# Patient Record
Sex: FEMALE | Race: WHITE | NOT HISPANIC OR LATINO | Employment: FULL TIME | URBAN - METROPOLITAN AREA
[De-identification: names, ages, dates, MRNs, and addresses within clinical notes are randomized per-mention and may not be internally consistent; named-entity substitution may affect disease eponyms.]

---

## 2017-07-10 ENCOUNTER — TELEPHONE (OUTPATIENT)
Dept: FAMILY MEDICINE | Facility: OTHER | Age: 27
End: 2017-07-10

## 2017-07-10 NOTE — TELEPHONE ENCOUNTER
Attempted number patient called from, but it was not her name on the VM - did not LM.   Attempted number in chart - unable to LM.     Kari Parson, RN, BSN

## 2017-07-10 NOTE — TELEPHONE ENCOUNTER
[facility] phone call message- patient reporting a symptom:    Symptom or request: irregular period ans cramping    Duration (how long have symptoms been present): since yesterday  Have you been treated for this before? No    Additional comments: Patient finished her period last week and stated spotting yesterday. When should she be seen for this?    Call taken on 7/10/2017 at 9:19 AM by Naty Arcos

## 2017-07-11 ENCOUNTER — ALLIED HEALTH/NURSE VISIT (OUTPATIENT)
Dept: FAMILY MEDICINE | Facility: OTHER | Age: 27
End: 2017-07-11
Payer: COMMERCIAL

## 2017-07-11 DIAGNOSIS — Z32.00 POSSIBLE PREGNANCY, NOT YET CONFIRMED: Primary | ICD-10-CM

## 2017-07-11 LAB — BETA HCG QUAL IFA URINE: NEGATIVE

## 2017-07-11 PROCEDURE — 84703 CHORIONIC GONADOTROPIN ASSAY: CPT | Performed by: ADVANCED PRACTICE MIDWIFE

## 2017-07-11 NOTE — PROGRESS NOTES
"Jacqueline Bolden is a 27 year old here today for a pregnancy test.  LMP: 17  Wt: 161 lbs.    Symptoms include: Patient had spotting on Saturday and . Currently having bilateral lower abdominal cramping \"like period cramps\". Was dizzy on Saturday, but resolved now.     Pregnancy test ordered per standing order.    Patient informed of negative pregnancy test.     Pregnancy planning discussed: pre-conception care, nutrition, smoking, alcohol & drug use, pre-galileo vitamins.    Plan:  Patient to call back if symptoms do not improve, symptoms worsen, or with further questions or concerns.  Huddled with JKD as patient was requesting a quant - request was declined as it may not show up anyway.   Advised patient to wait until at least 17 prior to taking another test - even then may be too early (would be 3w6d based on LMP).     RECOMMENDED DISPOSITION:  Home care advice -   Will comply with recommendation: YES   If further questions/concerns or if Sx do not improve, worsen or new Sx develop, call your PCP or [facility] as soon as possible.    NOTES:  Disposition was determined by the first positive assessment question, therefore all previous assessment questions were negative.     Guideline used:  Telephone Triage Protocols for Nurses, Fourth Edition, Ana Boateng  Message handled by Nurse Triage with [facility] - provider name: Ludy Moyer CNM.    Kari Parson, RN, BSN      "

## 2017-07-11 NOTE — MR AVS SNAPSHOT
"              After Visit Summary   2017    Jacqueline Bolden    MRN: [id number]           Patient Information     Date Of Birth          1990        Visit Information        Provider Department      2017 2:30 PM NL RN TEAM A, TAN [facility]        Today's Diagnoses     Possible pregnancy, not yet confirmed    -  1       Follow-ups after your visit        Who to contact     If you have questions or need follow up information about today's clinic visit or your schedule please contact [facility] directly at [phone].  Normal or non-critical lab and imaging results will be communicated to you by [employer], letter or phone within 4 business days after the clinic has received the results. If you do not hear from us within 7 days, please contact the clinic through [email or web address] or phone. If you have a critical or abnormal lab result, we will notify you by phone as soon as possible.  Submit refill requests through [employer] or call your pharmacy and they will forward the refill request to us. Please allow 3 business days for your refill to be completed.          Additional Information About Your Visit        MyChart Information     [employer] lets you send messages to your doctor, view your test results, renew your prescriptions, schedule appointments and more. To sign up, go to [email or web address] . Click on \"Log in\" on the left side of the screen, which will take you to the Welcome page. Then click on \"Sign up Now\" on the right side of the page.     You will be asked to enter the access code listed below, as well as some personal information. Please follow the directions to create your username and password.     Your access code is: [email or web address]  Expires: 10/9/2017  3:17 PM     Your access code will  in 90 days. If you need help or a new code, please call your [facility] or [phone].        Care EveryWhere ID     This is your Care EveryWhere ID. This could be " used by other organizations to access your [place] medical records  [id number]         Blood Pressure from Last 3 Encounters:   02/10/16 124/80   08/02/11 102/60   12/22/09 102/60    Weight from Last 3 Encounters:   02/10/16 177 lb (80.3 kg)   08/02/11 145 lb 12.8 oz (66.1 kg)   12/22/09 150 lb (68 kg)              We Performed the Following     Beta HCG qual IFA urine        Primary Care Provider Office Phone # Fax #    Jhoana Wen PA-C [phone]       [facility] [address]  [facility] [address]        Equal Access to Services     ARYA ZAMUDIO : Hadii elias tanner Soalfredo, waaxda luqadaha, qaybta kaalmada adelucioyada, beatrice bullock . So [facility] [phone].    ATENCIÓN: Si habla español, tiene a souza disposición servicios gratuitos de asistencia lingüística. Llame al [phone].    We comply with applicable federal civil rights laws and [place] laws. We do not discriminate on the basis of race, color, national origin, age, disability sex, sexual orientation or gender identity.            Thank you!     Thank you for choosing [facility]  for your care. Our goal is always to provide you with excellent care. Hearing back from our patients is one way we can continue to improve our services. Please take a few minutes to complete the written survey that you may receive in the mail after your visit with us. Thank you!             Your Updated Medication List - Protect others around you: Learn how to safely use, store and throw away your medicines at www.disposemymeds.org.      Notice  As of 7/11/2017  3:17 PM    You have not been prescribed any medications.

## 2017-09-07 ENCOUNTER — OFFICE VISIT (OUTPATIENT)
Dept: OBGYN | Facility: OTHER | Age: 27
End: 2017-09-07
Payer: COMMERCIAL

## 2017-09-07 VITALS
HEIGHT: 61 IN | HEART RATE: 84 BPM | DIASTOLIC BLOOD PRESSURE: 70 MMHG | SYSTOLIC BLOOD PRESSURE: 110 MMHG | WEIGHT: 165.5 LBS | BODY MASS INDEX: 31.25 KG/M2

## 2017-09-07 DIAGNOSIS — N97.9 FEMALE INFERTILITY: Primary | ICD-10-CM

## 2017-09-07 DIAGNOSIS — N93.9 ABNORMAL UTERINE BLEEDING: ICD-10-CM

## 2017-09-07 DIAGNOSIS — R10.2 PELVIC PAIN IN FEMALE: ICD-10-CM

## 2017-09-07 PROCEDURE — 99203 OFFICE O/P NEW LOW 30 MIN: CPT | Performed by: OBSTETRICS & GYNECOLOGY

## 2017-09-07 NOTE — NURSING NOTE
"Chief Complaint   Patient presents with     Consult     Fertility       Initial /70 (BP Location: Left arm, Patient Position: Chair, Cuff Size: Adult Regular)  Pulse 84  Ht 5' 1.42\" (1.56 m)  Wt 165 lb 8 oz (75.1 kg)  LMP 08/28/2017 (Approximate)  BMI 30.85 kg/m2 Estimated body mass index is 30.85 kg/(m^2) as calculated from the following:    Height as of this encounter: 5' 1.42\" (1.56 m).    Weight as of this encounter: 165 lb 8 oz (75.1 kg).  Medication Reconciliation: complete     Penelope Garza, CMA  September 7, 2017      "

## 2017-09-07 NOTE — MR AVS SNAPSHOT
After Visit Summary   9/7/2017    Jacqueline Bolden    MRN: [id number]           Patient Information     Date Of Birth          1990        Visit Information        Provider Department      9/7/2017 9:45 AM Anna Norris MD [facility]        Today's Diagnoses     Female infertility    -  1    Pelvic pain in female        Abnormal uterine bleeding          Care Instructions    Please call [phone] to schedule your HSG    Understanding Fertility Problems: Improving Your Chances  Your doctor may suggest some simple methods to help you get pregnant. Most focus on predicting ovulation--the time when sex has the best chance of success. Your doctor may also advise working on other factors that can affect fertility.  Predicting Ovulation  Conception is only possible when a woman is ovulating. One signal of ovulation is a surge in the hormone LH. Other signals include changes in body temperature and changes in cervical mucus.  Ovulation Predictor Kits  One of the best signals of ovulation is a sudden increase in the hormone LH. A simple urine test called an ovulation predictor kit (available at most drugstores) can help pinpoint this surge. Have sex the day you notice the surge. Keep having sex every 1-2 days over the next few days, or as often as your doctor suggests.  Body Temperature Changes  A woman s resting temperature (basal body temperature) often drops just before ovulation. Your doctor may ask you to take your temperature each morning to pinpoint this change. Have sex the day your temperature drops. Keep having sex daily until the day after your temperature rises again.  Cervical Mucus Changes  Shortly before ovulation a woman s cervical mucus gets clear and stretchy. The mucus also increases in quantity. This makes it easier for sperm to travel through the cervix. Not all women notice these changes. But if you do, begin having sex daily until the mucus thickens  again.  Working on Other Factors That Affect Fertility  Certain lifestyle and health habits can affect fertility. Be sure to talk with your doctor about these issues. You may be able to make some simple changes to improve your chances of conception. Keep in mind, though, that it can take time for healthy changes to improve your fertility.  Weight Problems   Being overweight or underweight can affect hormone levels. In women, this can impair ovulation. In men, obesity can decrease sperm count.  Medications, Supplements, and Herbal Remedies  Medications, supplements, and herbal remedies can affect hormone levels in men and women. They can also affect the quantity and quality of sperm. Be sure to tell your doctor about any substances you take.  Sexually Transmitted Diseases  Sexually transmitted diseases (STDs) can scar the reproductive organs in both men and women. If you ve had an STD, be sure to tell your doctor.  Testicular Heat  A man s testicles are normally a few degrees cooler than the rest of his body. When the testicles are too warm, sperm production may decline. Try to avoid excess heat from things like hot tubs and saunas.  Smoking, Alcohol, and Drugs  Smoking can affect estrogen levels and decrease egg production in women. Men who smoke may have lowered sperm count. Excessive alcohol or drug use can also decrease fertility in both men and women.  Chemical Exposure  Certain chemicals can affect hormone levels. Talk with your doctor if you re regularly exposed to strong chemicals. You should also ask about lubricants used during sex. Some types can be toxic to sperm.  Other Factors  Excessive exercise can decrease hormone production. It can also cause irregular menstruation in women. Ongoing stress is another factor that may affect fertility and cause ovulation problems.                Follow-ups after your visit        Future tests that were ordered for you today     Open Future Orders        Priority  "Expected Expires Ordered    US Pelvic Complete with Transvaginal Routine  2017    XR Hysterosalpingogram Routine 2017            Who to contact     If you have questions or need follow up information about today's clinic visit or your schedule please contact [facility] directly at [phone].  Normal or non-critical lab and imaging results will be communicated to you by [employer], letter or phone within 4 business days after the clinic has received the results. If you do not hear from us within 7 days, please contact the clinic through [employer] or phone. If you have a critical or abnormal lab result, we will notify you by phone as soon as possible.  Submit refill requests through [employer] or call your pharmacy and they will forward the refill request to us. Please allow 3 business days for your refill to be completed.          Additional Information About Your Visit        [employer] Information     [employer] lets you send messages to your doctor, view your test results, renew your prescriptions, schedule appointments and more. To sign up, go to [email or web address] . Click on \"Log in\" on the left side of the screen, which will take you to the Welcome page. Then click on \"Sign up Now\" on the right side of the page.     You will be asked to enter the access code listed below, as well as some personal information. Please follow the directions to create your username and password.     Your access code is: [email or web address]  Expires: 10/9/2017  3:17 PM     Your access code will  in 90 days. If you need help or a new code, please call your [facility] or [phone].        Care EveryWhere ID     This is your Care EveryWhere ID. This could be used by other organizations to access your [place] medical records  [id number]        Your Vitals Were     Pulse Height Last Period BMI (Body Mass Index)          84 5' 1.42\" (1.56 m) 2017 (Approximate) 30.85 kg/m2      "    Blood Pressure from Last 3 Encounters:   09/07/17 110/70   02/10/16 124/80   08/02/11 102/60    Weight from Last 3 Encounters:   09/07/17 165 lb 8 oz (75.1 kg)   02/10/16 177 lb (80.3 kg)   08/02/11 145 lb 12.8 oz (66.1 kg)               Primary Care Provider Office Phone # Fax #    Jhoana Wen PA-C [phone]       [address] [place] [address]        Equal Access to Services     [facility]: Hadii aad ku hadasho Soomaali, waaxda luqadaha, qaybta kaalmada adeegyada, waxsandra bullock . So [facility] [phone].    ATENCIÓN: Si habla español, tiene a souza disposición servicios gratuitos de asistencia lingüística. [facility] [phone].    We comply with applicable federal civil rights laws and [place] laws. We do not discriminate on the basis of race, color, national origin, age, disability sex, sexual orientation or gender identity.            Thank you!     Thank you for choosing [facility]  for your care. Our goal is always to provide you with excellent care. Hearing back from our patients is one way we can continue to improve our services. Please take a few minutes to complete the written survey that you may receive in the mail after your visit with us. Thank you!             Your Updated Medication List - Protect others around you: Learn how to safely use, store and throw away your medicines at www.disposemymeds.org.      Notice  As of 9/7/2017 10:22 AM    You have not been prescribed any medications.

## 2017-09-07 NOTE — PROGRESS NOTES
OB/GYN New   2017      NAME:  Jacqueline Bolden  PCP:  Jhoana Wen  MRN:  [id number]      Impression / Plan     27 year old  with:      ICD-10-CM    1. Female infertility N97.9 XR Hysterosalpingogram   2. Pelvic pain in female R10.2 US Pelvic Complete with Transvaginal     XR Hysterosalpingogram   3. Abnormal uterine bleeding N93.9 US Pelvic Complete with Transvaginal     Body mass index is 30.85 kg/(m^2).   E cig use - recommend cessation.     We discussed fertility and management options. Patient appears to be ovulating. I recommend HSG to evaluate tubal patency and ultrasound to look at the ovaries. We will consider laboratory evaluation at a later date. Patient has no clinical signs of endocrine abnormalities.  [facility] semen analysis which only assesses the count.   Handout given for HSG and ways to optimize fertility.      I will call her with the results and she will consider follow up in the office to discuss the next steps.      Chief Complaint     Chief Complaint   Patient presents with     Consult     Fertility       HPI     Jacqueline Bolden is a  27 year old female who is seen for infertility.   She has been trying to get pregnant for 13 months.  She has not used contraception in about 4 - 6 years.     Family history of endometriosis.  She had a previous provider that was concerned she may have endometriosis due ot pelvic pain.  Pain is improved since then.  Personal history of anemia.      Female factor:    General health: healthy.    Prior pregnancies:  No  Previous infertility work up:  No  Most recent form of birth control:  IUD removed in .  No birth control since then.  OCP in high school.    History of STI:  No  Ovulation predictor kits: Yes - she has used them a few times.  She seems to be ovulating.    Timed intercourse: Yes  Tubal study done?:  No    Occupation of Patient: Mental  health    Chemical or toxin exposure at home or work: Yes    Male factor:   General health: healthy.    Father of prior pregnancies:  No  Semen Analysis: [employer] test was normal.        Occupation of Partner:    Chemical or toxin exposure at home or work: No      Patient's last menstrual period was 08/28/2017 (approximate).  Regular cycles.  Every 28-30 days.  Last 5 -6 days.  One heavy day.  No pain aside from some back pain and breast pain.  Spotting 6 days after her period this month and last month.  This is not typical for her.      Patient has occasional dyspareunia on the left.  Sporadic and not necessarily associated with deep penetration.      Date of Last Pap Smear:  At outside facility ([place]) in about 2015, normal per patient.    Lab Results   Component Value Date    PAP NIL 06/26/2008     Previous abnormal pap smear: No       Problem List     Patient Active Problem List    Diagnosis Date Noted     CARDIOVASCULAR SCREENING; LDL GOAL LESS THAN 160 10/31/2010     Priority: Medium     Tobacco use disorder 04/11/2007     Priority: Medium     Dysmenorrhea 12/01/2005     Priority: Medium       Medications     No current outpatient prescriptions on file.     No current facility-administered medications for this visit.         Allergies     Allergies   Allergen Reactions     No Known Drug Allergies        Past Medical/Surgical History     Past Medical History:   Diagnosis Date     Dysmenorrhea      Irregular menstrual cycle      Surveillance of previously prescribed intrauterine contraceptive device 12/2008    angelita       Past Surgical History:   Procedure Laterality Date     NO HISTORY OF SURGERY          Social History     Social History     Social History     Marital status:      Spouse name: N/A     Number of children: N/A     Years of education: N/A     Occupational History     student 0student     [employer]     [employer] Student  "    Social History Main Topics     Smoking status: Current Every Day Smoker     Packs/day: 1.00     Types: Cigarettes     Smokeless tobacco: Never Used      Comment:  1 pack every 5 days     Alcohol use Yes      Comment: OCC.     Drug use: No     Sexual activity: Yes     Partners: Male     Birth control/ protection: Condom     Other Topics Concern     Not on file     Social History Narrative    Lives with housemates. No domestic violence issues.       Family History      Family History   Problem Relation Age of Onset     Family History Negative No family hx of        ROS     A 10 organ review of systems was asked and the pertinent positives and negatives are listed in the HPI. All other organ systems can be considered negative.     Physical Exam   Vitals: /70 (BP Location: Left arm, Patient Position: Chair, Cuff Size: Adult Regular)  Pulse 84  Ht 5' 1.42\" (1.56 m)  Wt 165 lb 8 oz (75.1 kg)  LMP 08/28/2017 (Approximate)  BMI 30.85 kg/m2    General: Comfortable, no obvious distress, overweight body habitus  Psych: Alert and orientated x 3. Appropriate affect, good insight.   : deferred  Extremities: No peripheral edema, nontender       Labs/Imaging       Labs were reviewed in Epic   TSH   Date Value Ref Range Status   02/19/2009 2.00 0.4 - 5.0 mU/L Final       Imaging was reviewed in Epic.   No recent imaging.      40 minutes was spent with patient, more than 50% counseling and coordinating care    Anna Norris MD       "

## 2017-09-07 NOTE — PATIENT INSTRUCTIONS
Please call [phone] to schedule your HSG    Understanding Fertility Problems: Improving Your Chances  Your doctor may suggest some simple methods to help you get pregnant. Most focus on predicting ovulation--the time when sex has the best chance of success. Your doctor may also advise working on other factors that can affect fertility.  Predicting Ovulation  Conception is only possible when a woman is ovulating. One signal of ovulation is a surge in the hormone LH. Other signals include changes in body temperature and changes in cervical mucus.  Ovulation Predictor Kits  One of the best signals of ovulation is a sudden increase in the hormone LH. A simple urine test called an ovulation predictor kit (available at most [employer]) can help pinpoint this surge. Have sex the day you notice the surge. Keep having sex every 1-2 days over the next few days, or as often as your doctor suggests.  Body Temperature Changes  A woman s resting temperature (basal body temperature) often drops just before ovulation. Your doctor may ask you to take your temperature each morning to pinpoint this change. Have sex the day your temperature drops. Keep having sex daily until the day after your temperature rises again.  Cervical Mucus Changes  Shortly before ovulation a woman s cervical mucus gets clear and stretchy. The mucus also increases in quantity. This makes it easier for sperm to travel through the cervix. Not all women notice these changes. But if you do, begin having sex daily until the mucus thickens again.  Working on Other Factors That Affect Fertility  Certain lifestyle and health habits can affect fertility. Be sure to talk with your doctor about these issues. You may be able to make some simple changes to improve your chances of conception. Keep in mind, though, that it can take time for healthy changes to improve your fertility.  Weight Problems   Being overweight or underweight can affect hormone levels. In women,  this can impair ovulation. In men, obesity can decrease sperm count.  Medications, Supplements, and Herbal Remedies  Medications, supplements, and herbal remedies can affect hormone levels in men and women. They can also affect the quantity and quality of sperm. Be sure to tell your doctor about any substances you take.  Sexually Transmitted Diseases  Sexually transmitted diseases (STDs) can scar the reproductive organs in both men and women. If you ve had an STD, be sure to tell your doctor.  Testicular Heat  A man s testicles are normally a few degrees cooler than the rest of his body. When the testicles are too warm, sperm production may decline. Try to avoid excess heat from things like hot tubs and saunas.  Smoking, Alcohol, and Drugs  Smoking can affect estrogen levels and decrease egg production in women. Men who smoke may have lowered sperm count. Excessive alcohol or drug use can also decrease fertility in both men and women.  Chemical Exposure  Certain chemicals can affect hormone levels. Talk with your doctor if you re regularly exposed to strong chemicals. You should also ask about lubricants used during sex. Some types can be toxic to sperm.  Other Factors  Excessive exercise can decrease hormone production. It can also cause irregular menstruation in women. Ongoing stress is another factor that may affect fertility and cause ovulation problems.

## 2017-09-28 ENCOUNTER — RADIANT APPOINTMENT (OUTPATIENT)
Dept: ULTRASOUND IMAGING | Facility: CLINIC | Age: 27
End: 2017-09-28
Attending: OBSTETRICS & GYNECOLOGY
Payer: COMMERCIAL

## 2017-09-28 DIAGNOSIS — N93.9 ABNORMAL UTERINE BLEEDING: ICD-10-CM

## 2017-09-28 DIAGNOSIS — R10.2 PELVIC PAIN IN FEMALE: ICD-10-CM

## 2017-09-28 LAB — RADIOLOGIST FLAGS: NORMAL

## 2017-09-28 PROCEDURE — 76830 TRANSVAGINAL US NON-OB: CPT | Performed by: STUDENT IN AN ORGANIZED HEALTH CARE EDUCATION/TRAINING PROGRAM

## 2017-09-28 PROCEDURE — 76856 US EXAM PELVIC COMPLETE: CPT | Performed by: STUDENT IN AN ORGANIZED HEALTH CARE EDUCATION/TRAINING PROGRAM

## 2017-10-31 ENCOUNTER — RADIANT APPOINTMENT (OUTPATIENT)
Dept: GENERAL RADIOLOGY | Facility: CLINIC | Age: 27
End: 2017-10-31
Attending: OBSTETRICS & GYNECOLOGY
Payer: COMMERCIAL

## 2017-10-31 PROCEDURE — 74740 X-RAY FEMALE GENITAL TRACT: CPT

## 2017-10-31 PROCEDURE — 58340 CATHETER FOR HYSTEROGRAPHY: CPT

## 2017-10-31 RX ORDER — IOPAMIDOL 510 MG/ML
50 INJECTION, SOLUTION INTRAVASCULAR ONCE
Status: COMPLETED | OUTPATIENT
Start: 2017-10-31 | End: 2017-10-31

## 2017-10-31 RX ADMIN — IOPAMIDOL 10 ML: 510 INJECTION, SOLUTION INTRAVASCULAR at 12:06

## 2017-11-01 ENCOUNTER — TELEPHONE (OUTPATIENT)
Dept: FAMILY MEDICINE | Facility: CLINIC | Age: 27
End: 2017-11-01

## 2017-11-01 ENCOUNTER — MYC MEDICAL ADVICE (OUTPATIENT)
Dept: OBGYN | Facility: OTHER | Age: 27
End: 2017-11-01

## 2017-11-01 NOTE — TELEPHONE ENCOUNTER
Reason for Call:  Other call back    Detailed comments: Patient calling to see if we have received HSG test. Patient would like a confirmation call once we have that     Phone Number Patient can be reached at: Cell number on file:    Telephone Information:   Mobile [phone]       Best Time: any    Can we leave a detailed message on this number? YES    Call taken on 11/1/2017 at 9:28 AM by Janet Real

## 2017-11-02 NOTE — TELEPHONE ENCOUNTER
Long discussion regarding imaging results.  Small chance she had spasm that resulted in the appearance of tubal occlusion.  Recommend she see FLETCHER.  Dx LS and chromotubation could be considered in the future.      20 min

## 2017-11-10 ENCOUNTER — HEALTH MAINTENANCE LETTER (OUTPATIENT)
Age: 27
End: 2017-11-10

## 2017-11-17 ENCOUNTER — TRANSFERRED RECORDS (OUTPATIENT)
Dept: HEALTH INFORMATION MANAGEMENT | Facility: CLINIC | Age: 27
End: 2017-11-17

## 2017-11-20 DIAGNOSIS — Z31.41 FERTILITY TESTING: Primary | ICD-10-CM

## 2017-11-24 DIAGNOSIS — Z31.41 FERTILITY TESTING: ICD-10-CM

## 2017-11-24 LAB
ABO + RH BLD: NORMAL
ABO + RH BLD: NORMAL
BLD GP AB SCN SERPL QL: NORMAL
BLOOD BANK CMNT PATIENT-IMP: NORMAL
ESTRADIOL SERPL-MCNC: 30 PG/ML
FSH SERPL-ACNC: 6.9 IU/L
LH SERPL-ACNC: 4.1 IU/L
SPECIMEN EXP DATE BLD: NORMAL
TSH SERPL DL<=0.005 MIU/L-ACNC: 1.4 MU/L (ref 0.4–4)

## 2017-11-24 PROCEDURE — 86900 BLOOD TYPING SEROLOGIC ABO: CPT | Performed by: NURSE PRACTITIONER

## 2017-11-24 PROCEDURE — 86850 RBC ANTIBODY SCREEN: CPT | Performed by: NURSE PRACTITIONER

## 2017-11-24 PROCEDURE — 83001 ASSAY OF GONADOTROPIN (FSH): CPT | Performed by: NURSE PRACTITIONER

## 2017-11-24 PROCEDURE — 83002 ASSAY OF GONADOTROPIN (LH): CPT | Performed by: NURSE PRACTITIONER

## 2017-11-24 PROCEDURE — 86762 RUBELLA ANTIBODY: CPT | Performed by: NURSE PRACTITIONER

## 2017-11-24 PROCEDURE — 99000 SPECIMEN HANDLING OFFICE-LAB: CPT | Performed by: NURSE PRACTITIONER

## 2017-11-24 PROCEDURE — 82670 ASSAY OF TOTAL ESTRADIOL: CPT | Performed by: NURSE PRACTITIONER

## 2017-11-24 PROCEDURE — 86787 VARICELLA-ZOSTER ANTIBODY: CPT | Performed by: NURSE PRACTITIONER

## 2017-11-24 PROCEDURE — 86901 BLOOD TYPING SEROLOGIC RH(D): CPT | Performed by: NURSE PRACTITIONER

## 2017-11-24 PROCEDURE — 87389 HIV-1 AG W/HIV-1&-2 AB AG IA: CPT | Performed by: NURSE PRACTITIONER

## 2017-11-24 PROCEDURE — 87340 HEPATITIS B SURFACE AG IA: CPT | Performed by: NURSE PRACTITIONER

## 2017-11-24 PROCEDURE — 86803 HEPATITIS C AB TEST: CPT | Performed by: NURSE PRACTITIONER

## 2017-11-24 PROCEDURE — 83520 IMMUNOASSAY QUANT NOS NONAB: CPT | Mod: 90 | Performed by: NURSE PRACTITIONER

## 2017-11-24 PROCEDURE — 82306 VITAMIN D 25 HYDROXY: CPT | Performed by: NURSE PRACTITIONER

## 2017-11-24 PROCEDURE — 36415 COLL VENOUS BLD VENIPUNCTURE: CPT | Performed by: NURSE PRACTITIONER

## 2017-11-24 PROCEDURE — 84443 ASSAY THYROID STIM HORMONE: CPT | Performed by: NURSE PRACTITIONER

## 2017-11-27 DIAGNOSIS — N94.6 DYSMENORRHEA: Primary | ICD-10-CM

## 2017-11-27 LAB
DEPRECATED CALCIDIOL+CALCIFEROL SERPL-MC: 24 UG/L (ref 20–75)
HBV SURFACE AG SERPL QL IA: NONREACTIVE
HCV AB SERPL QL IA: NONREACTIVE
HIV 1+2 AB+HIV1 P24 AG SERPL QL IA: NONREACTIVE
MIS SERPL-MCNC: 2.4 NG/ML (ref 0.4–16.02)
RUBV IGG SERPL IA-ACNC: 13 IU/ML
VZV IGG SER QL IA: 1.4 AI (ref 0–0.8)

## 2017-11-27 RX ORDER — DROSPIRENONE AND ETHINYL ESTRADIOL 0.03MG-3MG
1 KIT ORAL DAILY
Qty: 30 TABLET | Refills: 11 | Status: SHIPPED | OUTPATIENT
Start: 2017-11-27 | End: 2017-12-15

## 2017-12-15 ENCOUNTER — MYC MEDICAL ADVICE (OUTPATIENT)
Dept: OBGYN | Facility: OTHER | Age: 27
End: 2017-12-15

## 2017-12-15 DIAGNOSIS — N94.6 DYSMENORRHEA: ICD-10-CM

## 2017-12-15 RX ORDER — DROSPIRENONE AND ETHINYL ESTRADIOL 0.03MG-3MG
1 KIT ORAL DAILY
Qty: 30 TABLET | Refills: 11 | Status: SHIPPED | OUTPATIENT
Start: 2017-12-15 | End: 2018-01-05

## 2017-12-29 ENCOUNTER — TRANSFERRED RECORDS (OUTPATIENT)
Dept: HEALTH INFORMATION MANAGEMENT | Facility: CLINIC | Age: 27
End: 2017-12-29

## 2017-12-29 ENCOUNTER — TELEPHONE (OUTPATIENT)
Dept: OBGYN | Facility: OTHER | Age: 27
End: 2017-12-29

## 2017-12-29 NOTE — TELEPHONE ENCOUNTER
Is wondering if she can get a pollyp removed in clinic or does this need to be in [place] or [place]? It is per her Dr in [place]. Dr Casey.

## 2017-12-29 NOTE — TELEPHONE ENCOUNTER
Can we get more information? Is this a cervical or uterine polyp? There was a questionable uterine polyp seen on ultrasound in September, but if she had more recent imaging with Dr. Lundberg, can they send results? Anjelica GREER CNP

## 2017-12-29 NOTE — TELEPHONE ENCOUNTER
"Per [facility] Dr[facility] Roderick Lundberg: \"we identified the presence of a 1 cm polyp originating from the posterior wall of the endometrial cavity.  In addition, there was a serpiginous adnexal mass seen on the left.  Jacqueline's prior HSG showed bilateral tubal fill but no spill, and I am concerned about the presence of one or more hydrosalpinges.  I have recommended the polyp be removed and the tubes further assessed prior to IVF.\"    Naty Daigle RN    "

## 2018-01-01 NOTE — TELEPHONE ENCOUNTER
The patient will need to make an appointment in the office. She will need a laparoscopy and a hysteroscopy, which is typically done at the  surgery center (outpatient surgery).

## 2018-01-02 NOTE — TELEPHONE ENCOUNTER
Pt informed. She is wondering how far out it would be to schedule the surgery? She did not want to schedule the consult at this time.  Thank you,  Krystina Hendrix- Pt Rep.

## 2018-01-02 NOTE — TELEPHONE ENCOUNTER
Usually 2-4 weeks from the consult to the time of surgery.  She will also need a preop within 30 days of the procedure.

## 2018-01-04 ENCOUNTER — MYC MEDICAL ADVICE (OUTPATIENT)
Dept: OBGYN | Facility: OTHER | Age: 28
End: 2018-01-04

## 2018-01-04 DIAGNOSIS — N94.6 DYSMENORRHEA: ICD-10-CM

## 2018-01-05 RX ORDER — DROSPIRENONE AND ETHINYL ESTRADIOL 0.03MG-3MG
1 KIT ORAL DAILY
Qty: 30 TABLET | Refills: 1 | Status: SHIPPED | OUTPATIENT
Start: 2018-01-05 | End: 2018-03-29

## 2018-01-05 NOTE — TELEPHONE ENCOUNTER
Please let the patient know I sent in the script for the birth control to [facility] in [place].  Also please help her set up a preop visit with family medicine asap

## 2018-01-05 NOTE — TELEPHONE ENCOUNTER
Left message for patient to return call, message below can be relayed to her and pre-op appointment scheduled with FP

## 2018-01-08 ENCOUNTER — TRANSFERRED RECORDS (OUTPATIENT)
Dept: HEALTH INFORMATION MANAGEMENT | Facility: CLINIC | Age: 28
End: 2018-01-08

## 2018-01-09 ENCOUNTER — OFFICE VISIT (OUTPATIENT)
Dept: FAMILY MEDICINE | Facility: OTHER | Age: 28
End: 2018-01-09
Payer: COMMERCIAL

## 2018-01-09 VITALS
DIASTOLIC BLOOD PRESSURE: 70 MMHG | SYSTOLIC BLOOD PRESSURE: 116 MMHG | BODY MASS INDEX: 30.55 KG/M2 | TEMPERATURE: 98.5 F | HEIGHT: 62 IN | RESPIRATION RATE: 16 BRPM | WEIGHT: 166 LBS | HEART RATE: 70 BPM

## 2018-01-09 DIAGNOSIS — Z01.818 PREOP GENERAL PHYSICAL EXAM: Primary | ICD-10-CM

## 2018-01-09 DIAGNOSIS — N84.0 UTERINE POLYP: ICD-10-CM

## 2018-01-09 PROCEDURE — 99214 OFFICE O/P EST MOD 30 MIN: CPT | Performed by: NURSE PRACTITIONER

## 2018-01-09 NOTE — NURSING NOTE
"Chief Complaint   Patient presents with     Pre-Op Exam       Initial /70 (BP Location: Right arm, Patient Position: Chair, Cuff Size: Adult Regular)  Pulse 70  Temp 98.5  F (36.9  C) (Oral)  Resp 16  Ht 5' 1.81\" (1.57 m)  Wt 166 lb (75.3 kg)  LMP 10/24/2017 (Approximate)  BMI 30.55 kg/m2 Estimated body mass index is 30.55 kg/(m^2) as calculated from the following:    Height as of this encounter: 5' 1.81\" (1.57 m).    Weight as of this encounter: 166 lb (75.3 kg).  Medication Reconciliation: complete    "

## 2018-01-09 NOTE — PATIENT INSTRUCTIONS
Before Your Surgery      Call your surgeon if there is any change in your health. This includes signs of a cold or flu (such as a sore throat, runny nose, cough, rash or fever).    Do not smoke, drink alcohol or take over the counter medicine (unless your surgeon or primary care doctor tells you to) for the 24 hours before and after surgery.    If you take prescribed drugs: Follow your doctor s orders about which medicines to take and which to stop until after surgery.    Eating and drinking prior to surgery: follow the instructions from your surgeon    Take a shower or bath the night before surgery. Use the soap your surgeon gave you to gently clean your skin. If you do not have soap from your surgeon, use your regular soap. Do not shave or scrub the surgery site.  Wear clean pajamas and have clean sheets on your bed.     Please hold all Nsaids (ibuprofen, advil, aleve) and aspirin. May take tylenol.     Please do not eat 8 hours prior to surgery, may have very light breakfast. May drink clear liquids up until 4 hours prior to surgery. If the surgery center guidelines are different follow their guidelines.     Thank you  Franny Haines CNP

## 2018-01-09 NOTE — MR AVS SNAPSHOT
After Visit Summary   1/9/2018    Jacqueline Bolden    MRN: [id number]           Patient Information     Date Of Birth          1990        Visit Information        Provider Department      1/9/2018 7:20 AM Franny Haines APRN CNP [facility]        Today's Diagnoses     Preop general physical exam    -  1    Uterine polyp          Care Instructions      Before Your Surgery      Call your surgeon if there is any change in your health. This includes signs of a cold or flu (such as a sore throat, runny nose, cough, rash or fever).    Do not smoke, drink alcohol or take over the counter medicine (unless your surgeon or primary care doctor tells you to) for the 24 hours before and after surgery.    If you take prescribed drugs: Follow your doctor s orders about which medicines to take and which to stop until after surgery.    Eating and drinking prior to surgery: follow the instructions from your surgeon    Take a shower or bath the night before surgery. Use the soap your surgeon gave you to gently clean your skin. If you do not have soap from your surgeon, use your regular soap. Do not shave or scrub the surgery site.  Wear clean pajamas and have clean sheets on your bed.     Please hold all Nsaids (ibuprofen, advil, aleve) and aspirin. May take tylenol.     Please do not eat 8 hours prior to surgery, may have very light breakfast. May drink clear liquids up until 4 hours prior to surgery. If the surgery center guidelines are different follow their guidelines.     Thank you  Franny Haines CNP            Follow-ups after your visit        Who to contact     If you have questions or need follow up information about today's clinic visit or your schedule please contact [facility] directly at [phone].  Normal or non-critical lab and imaging results will be communicated to you by MyChart, letter or phone within 4 business days after the clinic has received  "the results. If you do not hear from us within 7 days, please contact the clinic through [employer] or phone. If you have a critical or abnormal lab result, we will notify you by phone as soon as possible.  Submit refill requests through [employer] or call your pharmacy and they will forward the refill request to us. Please allow 3 business days for your refill to be completed.          Additional Information About Your Visit        [facility] Information     [employer] gives you secure access to your electronic health record. If you see a primary care provider, you can also send messages to your care team and make appointments. If you have questions, please call your primary care clinic.  If you do not have a primary care provider, please call [phone] and they will assist you.        Care EveryWhere ID     This is your Care EveryWhere ID. This could be used by other organizations to access your [place] medical records  [id number]        Your Vitals Were     Pulse Temperature Respirations Height Last Period BMI (Body Mass Index)    70 98.5  F (36.9  C) (Oral) 16 5' 1.81\" (1.57 m) 10/24/2017 (Approximate) 30.55 kg/m2       Blood Pressure from Last 3 Encounters:   01/09/18 116/70   09/07/17 110/70   02/10/16 124/80    Weight from Last 3 Encounters:   01/09/18 166 lb (75.3 kg)   09/07/17 165 lb 8 oz (75.1 kg)   02/10/16 177 lb (80.3 kg)              Today, you had the following     No orders found for display       Primary Care Provider Office Phone # Fax #    Jhoana Wen PA-C [phone]       [address] [place] [address]        Equal Access to Services     [facility] Hospital[facility]ENDRA : Hadross Castellano, olivia martin, beatrice johnson. So [facility] [phone].    ATENCIÓN: Si habla español, tiene a souza disposición servicios gratuitos de asistencia lingüística. Layla al [phone].    We comply with applicable federal civil rights laws and " [place] laws. We do not discriminate on the basis of race, color, national origin, age, disability, sex, sexual orientation, or gender identity.            Thank you!     Thank you for choosing [facility]  for your care. Our goal is always to provide you with excellent care. Hearing back from our patients is one way we can continue to improve our services. Please take a few minutes to complete the written survey that you may receive in the mail after your visit with us. Thank you!             Your Updated Medication List - Protect others around you: Learn how to safely use, store and throw away your medicines at www.disposemymeds.org.          This list is accurate as of: 1/9/18  8:13 AM.  Always use your most recent med list.                   Brand Name Dispense Instructions for use Diagnosis    drospirenone-ethinyl estradiol 3-0.03 MG per tablet    MAGDALENA    30 tablet    Take 1 tablet by mouth daily Skip placebo week and start a new pack immediately.    Dysmenorrhea

## 2018-01-16 ENCOUNTER — RECORDS - HEALTHEAST (OUTPATIENT)
Dept: ADMINISTRATIVE | Facility: OTHER | Age: 28
End: 2018-01-16

## 2018-01-16 LAB
LAB AP CHARGES (HE HISTORICAL CONVERSION): NORMAL
PATH REPORT.COMMENTS IMP SPEC: NORMAL
PATH REPORT.FINAL DX SPEC: NORMAL
PATH REPORT.GROSS SPEC: NORMAL
PATH REPORT.MICROSCOPIC SPEC OTHER STN: NORMAL
PATH REPORT.RELEVANT HX SPEC: NORMAL
RESULT FLAG (HE HISTORICAL CONVERSION): NORMAL

## 2018-02-09 ENCOUNTER — MEDICAL CORRESPONDENCE (OUTPATIENT)
Dept: HEALTH INFORMATION MANAGEMENT | Facility: CLINIC | Age: 28
End: 2018-02-09

## 2018-02-12 ENCOUNTER — TRANSFERRED RECORDS (OUTPATIENT)
Dept: HEALTH INFORMATION MANAGEMENT | Facility: CLINIC | Age: 28
End: 2018-02-12

## 2018-02-12 ENCOUNTER — TELEPHONE (OUTPATIENT)
Dept: OBGYN | Facility: OTHER | Age: 28
End: 2018-02-12

## 2018-02-12 NOTE — TELEPHONE ENCOUNTER
[facility].  Patient had a transfer performed on 1/31/2018 with one embryo transferred into the uterine cavity. Quant on 2/9/2018 was 85.3, second quant on 2/12/2018 was 440.5. This gives her an estimated date of confinement of 10/19/2018. Blood type is AB+. She is on progesterone supplementation.  This note was sent for scanning    Please call patient and offer her a first OB appointment with me or one of my partners sometime the end of March, early April.

## 2018-02-28 ENCOUNTER — TRANSFERRED RECORDS (OUTPATIENT)
Dept: HEALTH INFORMATION MANAGEMENT | Facility: CLINIC | Age: 28
End: 2018-02-28

## 2018-03-13 ENCOUNTER — TRANSFERRED RECORDS (OUTPATIENT)
Dept: HEALTH INFORMATION MANAGEMENT | Facility: CLINIC | Age: 28
End: 2018-03-13

## 2018-03-29 ENCOUNTER — PRENATAL OFFICE VISIT (OUTPATIENT)
Dept: OBGYN | Facility: OTHER | Age: 28
End: 2018-03-29
Payer: COMMERCIAL

## 2018-03-29 VITALS
BODY MASS INDEX: 29.72 KG/M2 | WEIGHT: 161.5 LBS | SYSTOLIC BLOOD PRESSURE: 110 MMHG | HEART RATE: 86 BPM | DIASTOLIC BLOOD PRESSURE: 70 MMHG

## 2018-03-29 DIAGNOSIS — O09.811 PREGNANCY RESULTING FROM IN VITRO FERTILIZATION IN FIRST TRIMESTER: Primary | ICD-10-CM

## 2018-03-29 DIAGNOSIS — Z12.4 SCREENING FOR MALIGNANT NEOPLASM OF CERVIX: ICD-10-CM

## 2018-03-29 PROBLEM — O09.819 PREGNANCY CONCEIVED THROUGH IN VITRO FERTILIZATION: Status: ACTIVE | Noted: 2018-03-29

## 2018-03-29 LAB
ABO + RH BLD: NORMAL
ABO + RH BLD: NORMAL
ALBUMIN UR-MCNC: NEGATIVE MG/DL
APPEARANCE UR: CLEAR
BILIRUB UR QL STRIP: NEGATIVE
BLD GP AB SCN SERPL QL: NORMAL
BLOOD BANK CMNT PATIENT-IMP: NORMAL
COLOR UR AUTO: YELLOW
ERYTHROCYTE [DISTWIDTH] IN BLOOD BY AUTOMATED COUNT: 11.9 % (ref 10–15)
GLUCOSE UR STRIP-MCNC: NEGATIVE MG/DL
HCT VFR BLD AUTO: 36.5 % (ref 35–47)
HGB BLD-MCNC: 12 G/DL (ref 11.7–15.7)
HGB UR QL STRIP: NEGATIVE
KETONES UR STRIP-MCNC: NEGATIVE MG/DL
LEUKOCYTE ESTERASE UR QL STRIP: NEGATIVE
MCH RBC QN AUTO: 27.1 PG (ref 26.5–33)
MCHC RBC AUTO-ENTMCNC: 32.9 G/DL (ref 31.5–36.5)
MCV RBC AUTO: 83 FL (ref 78–100)
NITRATE UR QL: NEGATIVE
PH UR STRIP: 5.5 PH (ref 5–7)
PLATELET # BLD AUTO: 244 10E9/L (ref 150–450)
RBC # BLD AUTO: 4.42 10E12/L (ref 3.8–5.2)
SOURCE: NORMAL
SP GR UR STRIP: >1.03 (ref 1–1.03)
SPECIMEN EXP DATE BLD: NORMAL
UROBILINOGEN UR STRIP-ACNC: 0.2 EU/DL (ref 0.2–1)
WBC # BLD AUTO: 7.4 10E9/L (ref 4–11)

## 2018-03-29 PROCEDURE — 87086 URINE CULTURE/COLONY COUNT: CPT | Performed by: OBSTETRICS & GYNECOLOGY

## 2018-03-29 PROCEDURE — 86850 RBC ANTIBODY SCREEN: CPT | Performed by: OBSTETRICS & GYNECOLOGY

## 2018-03-29 PROCEDURE — 86780 TREPONEMA PALLIDUM: CPT | Performed by: OBSTETRICS & GYNECOLOGY

## 2018-03-29 PROCEDURE — G0145 SCR C/V CYTO,THINLAYER,RESCR: HCPCS | Performed by: OBSTETRICS & GYNECOLOGY

## 2018-03-29 PROCEDURE — 85027 COMPLETE CBC AUTOMATED: CPT | Performed by: OBSTETRICS & GYNECOLOGY

## 2018-03-29 PROCEDURE — 87389 HIV-1 AG W/HIV-1&-2 AB AG IA: CPT | Performed by: OBSTETRICS & GYNECOLOGY

## 2018-03-29 PROCEDURE — 81003 URINALYSIS AUTO W/O SCOPE: CPT | Performed by: OBSTETRICS & GYNECOLOGY

## 2018-03-29 PROCEDURE — 99207 ZZC FIRST OB VISIT: CPT | Performed by: OBSTETRICS & GYNECOLOGY

## 2018-03-29 PROCEDURE — 86762 RUBELLA ANTIBODY: CPT | Performed by: OBSTETRICS & GYNECOLOGY

## 2018-03-29 PROCEDURE — 36415 COLL VENOUS BLD VENIPUNCTURE: CPT | Performed by: OBSTETRICS & GYNECOLOGY

## 2018-03-29 PROCEDURE — 86901 BLOOD TYPING SEROLOGIC RH(D): CPT | Performed by: OBSTETRICS & GYNECOLOGY

## 2018-03-29 PROCEDURE — 87340 HEPATITIS B SURFACE AG IA: CPT | Performed by: OBSTETRICS & GYNECOLOGY

## 2018-03-29 PROCEDURE — 86900 BLOOD TYPING SEROLOGIC ABO: CPT | Performed by: OBSTETRICS & GYNECOLOGY

## 2018-03-29 NOTE — NURSING NOTE
"Chief Complaint   Patient presents with     Prenatal Care       Initial /70 (BP Location: Right arm, Patient Position: Chair, Cuff Size: Adult Regular)  Pulse 86  Wt 161 lb 8 oz (73.3 kg)  LMP 10/24/2017 (Approximate)  BMI 29.72 kg/m2 Estimated body mass index is 29.72 kg/(m^2) as calculated from the following:    Height as of 1/9/18: 5' 1.81\" (1.57 m).    Weight as of this encounter: 161 lb 8 oz (73.3 kg).  Medication Reconciliation: complete     Penelope Garza, [facility]  March 29, 2018      "

## 2018-03-29 NOTE — MR AVS SNAPSHOT
After Visit Summary   3/29/2018    Jacqueline Bolden    MRN: [id number]           Patient Information     Date Of Birth          1990        Visit Information        Provider Department      3/29/2018 8:30 AM Anna Norris MD [facility]        Today's Diagnoses     Pregnancy resulting from in vitro fertilization in first trimester    -  1    Screening for malignant neoplasm of cervix           Follow-ups after your visit        Follow-up notes from your care team     Return in about 4 weeks (around 4/26/2018) for OB Visit.      Your next 10 appointments already scheduled     Apr 30, 2018  8:00 AM CDT   ESTABLISHED PRENATAL with Anna Norris MD   [facility] ([facility])    [address] [facility] [address] [phone]              Who to contact     If you have questions or need follow up information about today's clinic visit or your schedule please contact [facility] directly at [phone].  Normal or non-critical lab and imaging results will be communicated to you by Vibeasehart, letter or phone within 4 business days after the clinic has received the results. If you do not hear from us within 7 days, please contact the clinic through [email or web address] or phone. If you have a critical or abnormal lab result, we will notify you by phone as soon as possible.  Submit refill requests through [employer] or call your pharmacy and they will forward the refill request to us. Please allow 3 business days for your refill to be completed.          Additional Information About Your Visit        MyChart Information     [employer] gives you secure access to your electronic health record. If you see a primary care provider, you can also send messages to your care team and make appointments. If you have questions, please call your primary care clinic.  If you do not have a primary care provider, please call [phone] and they will assist  you.        Care EveryWhere ID     This is your Care EveryWhere ID. This could be used by other organizations to access your [place] medical records  [id number]        Your Vitals Were     Pulse Last Period BMI (Body Mass Index)             86 10/24/2017 (Approximate) 29.72 kg/m2          Blood Pressure from Last 3 Encounters:   03/29/18 110/70   01/09/18 116/70   09/07/17 110/70    Weight from Last 3 Encounters:   03/29/18 161 lb 8 oz (73.3 kg)   01/09/18 166 lb (75.3 kg)   09/07/17 165 lb 8 oz (75.1 kg)              We Performed the Following     *UA reflex to Microscopic     ABO/Rh type and screen     Anti Treponema     CBC with platelets     Hepatitis B surface antigen     HIV Antigen Antibody Combo     Pap imaged thin layer screen reflex to HPV if ASCUS - recommend age 25 - 29     Rubella Antibody IgG Quantitative     Urine Culture Aerobic Bacterial          Today's Medication Changes          These changes are accurate as of 3/29/18 11:00 AM.  If you have any questions, ask your nurse or doctor.               Stop taking these medicines if you haven't already. Please contact your care team if you have questions.     drospirenone-ethinyl estradiol 3-0.03 MG per tablet   Commonly known as:  MAGDALENA   Stopped by:  Anna Norris MD                    Primary Care Provider Office Phone # Fax #    Jhoana Wen PA-C [phone]       [address] [facility] [address]        Equal Access to Services     [facility] Hospital[facility]ARENDRA AH: Hadii aad ku hadasho Soomaali, waaxda luqadaha, qaybta kaalmada adeegyada, beatrice hobson. So [facility] [phone].    ATENCIÓN: Si habla español, tiene a souza disposición servicios gratuitos de asistencia lingüística. Llame al [phone].    We comply with applicable federal civil rights laws and [place] laws. We do not discriminate on the basis of race, color, national origin, age, disability, sex, sexual orientation, or gender identity.             Thank you!     Thank you for choosing [facility]  for your care. Our goal is always to provide you with excellent care. Hearing back from our patients is one way we can continue to improve our services. Please take a few minutes to complete the written survey that you may receive in the mail after your visit with us. Thank you!             Your Updated Medication List - Protect others around you: Learn how to safely use, store and throw away your medicines at www.disposemymeds.org.          This list is accurate as of 3/29/18 11:00 AM.  Always use your most recent med list.                   Brand Name Dispense Instructions for use Diagnosis    PRENATAL VITAMINS PO           VITAMIN D (CHOLECALCIFEROL) PO      Take by mouth daily

## 2018-03-29 NOTE — PROGRESS NOTES
28 year old  at 10w6d presents for New OB appointment.  Estimated Date of Delivery: Oct 19, 2018.       [facility].  She had one embryo transferred into the uterine cavity on 18. First quant: 85.3 on 2018. Second quant: 40.5 on 2018. Date of conception 2018, EDC 10/19/2018.  See records scanned into Epic.  She has had an US but I do not have those records yet.     No complaints.   No vaginal bleeding, no leaking fluid, no contractions.    She did have some nausea but that has improved. She thinks she lost about 10 pounds in the last 10 weeks due to decreased appetite and nausea.     Electronic chart, including labs and imaging reviewed.    Last PHQ-9 score on record=   PHQ-9 SCORE 3/29/2018   Total Score 6        Date of Last Pap Smear:  At outside facility ([facility]) in about , normal per patient.      Obstetric History  Obstetric History       T0      L0     SAB0   TAB0   Ectopic0   Multiple0   Live Births0       # Outcome Date GA Lbr Saurabh/2nd Weight Sex Delivery Anes PTL Lv   1 Current                   Most Recent Immunizations   Administered Date(s) Administered     HepB 08/15/2001     Hib (PRP-T) 1993     Historical DTP/aP 1994     MMR 08/15/2001     OPV, trivalent, live 1994     TD (ADULT, 7+) 2004     Varicella Pt Report Hx of Varicella/Chicken Pox 1999        Patient Active Problem List   Diagnosis     Dysmenorrhea     Tobacco use disorder     CARDIOVASCULAR SCREENING; LDL GOAL LESS THAN 160     Pregnancy conceived through in vitro fertilization       Current Outpatient Prescriptions   Medication     Prenatal Multivit-Min-Fe-FA (PRENATAL VITAMINS PO)     VITAMIN D, CHOLECALCIFEROL, PO     No current facility-administered medications for this visit.        Allergies   Allergen Reactions     No Known Drug Allergies        History  Past Medical History:   Diagnosis Date     Dysmenorrhea      Irregular menstrual  cycle      Surveillance of previously prescribed intrauterine contraceptive device 2008    paraguard     Past Surgical History:   Procedure Laterality Date     NO HISTORY OF SURGERY         Social History     Social History     Marital status:      Spouse name: N/A     Number of children: N/A     Years of education: N/A     Occupational History     student 0student     [employer]     [employer] Student     Social History Main Topics     Smoking status: Former Smoker     Packs/day: 1.00     Types: Cigarettes     Smokeless tobacco: Never Used      Comment:  1 pack every 5 days     Alcohol use Yes      Comment: OCC.     Drug use: No     Sexual activity: Yes     Partners: Male     Birth control/ protection: Condom, Pill     Other Topics Concern     Not on file     Social History Narrative    Lives with housemates. No domestic violence issues.       ROS - Please see HPI, otherwise 10pt ROS negative.      Physical Exam  Vitals: /70 (BP Location: Right arm, Patient Position: Chair, Cuff Size: Adult Regular)  Pulse 86  Wt 161 lb 8 oz (73.3 kg)  LMP 10/24/2017 (Approximate)  BMI 29.72 kg/m2  BMI= Body mass index is 29.72 kg/(m^2).  Gen: Alert and oriented times 3, no acute distress.  Well developed, well nourished, pleasant.    Neck: Supple, no masses.  No thyromegaly.  Chest:  Non labored.  Clear to auscultation bilaterally.    Heart: Regular, normal S1, S2.  No murmurs.   Abdomen: Soft, nontender, nondistended.  No palpable masses.    :  Normal female external genitalia, Jef stage V.  No lesions.  Speculum exam reveals a normal vaginal vault, normal cervix.  No abnormal discharge.  Bimanual exam reveals a normal, mobile, nontender uterus, size consistent with dates.  No cervical motion tenderness.  Adnexa nontender with no palpable masses.   Extremities:  Nontender, no edema.    Pap obtained Yes      Assessment and Plan:  28 year old  with IUP at 10w6d.         ICD-10-CM    1. Pregnancy resulting from in vitro fertilization in first trimester O09.811 Hepatitis B surface antigen     Rubella Antibody IgG Quantitative     Anti Treponema     ABO/Rh type and screen     HIV Antigen Antibody Combo     Urine Culture Aerobic Bacterial     *UA reflex to Microscopic     CBC with platelets   2. Screening for malignant neoplasm of cervix Z12.4 Pap imaged thin layer screen reflex to HPV if ASCUS - recommend age 25 - 29       She will need a level 2 US and fetal echo.    Discussed:    Genetic screening options:  declined    Labs and ultrasound ordered      Benefits of breastfeeding    Physician coverage, exercise, weight gain, schedule of visits, routine and indicated ultrasounds, prenatal vitamins and childbirth education.      Body mass index is 29.72 kg/(m^2). - recommend 15-25 lbs (BMI 25-29.9)    Return in 4 weeks for routine OB care      30 minutes was spent face to face with the patient today discussing her history, diagnosis, and follow-up plan as noted above.  Over 50% of the visit was spent in counseling and coordination of care.    Anna Norris MD FACOG

## 2018-03-30 LAB
BACTERIA SPEC CULT: NORMAL
HBV SURFACE AG SERPL QL IA: NONREACTIVE
HIV 1+2 AB+HIV1 P24 AG SERPL QL IA: NONREACTIVE
Lab: NORMAL
RUBV IGG SERPL IA-ACNC: 14 IU/ML
SPECIMEN SOURCE: NORMAL
T PALLIDUM IGG+IGM SER QL: NEGATIVE

## 2018-03-30 ASSESSMENT — PATIENT HEALTH QUESTIONNAIRE - PHQ9: SUM OF ALL RESPONSES TO PHQ QUESTIONS 1-9: 6

## 2018-04-03 LAB
COPATH REPORT: NORMAL
PAP: NORMAL

## 2018-04-18 ENCOUNTER — TELEPHONE (OUTPATIENT)
Dept: OBGYN | Facility: OTHER | Age: 28
End: 2018-04-18

## 2018-04-18 DIAGNOSIS — N84.0 ENDOMETRIAL POLYP: ICD-10-CM

## 2018-04-18 DIAGNOSIS — N97.9 FEMALE INFERTILITY: Primary | ICD-10-CM

## 2018-04-18 NOTE — TELEPHONE ENCOUNTER
Patient needs letter/referral stating you referred her to [facility] for polyp removal, she was referred by IBS physician, but she needs this to come from you.  If you have any questions please call

## 2018-04-19 NOTE — TELEPHONE ENCOUNTER
Patient returned call and stated that the polyp removal was already done by Dr Casey with [facility] at the [facility].  She said that the referral needs to have the date that it was removed 1/11/2018.    Penelope Garza, [facility]  April 19, 2018

## 2018-04-19 NOTE — TELEPHONE ENCOUNTER
LM asking patient to return our call     When she calls back, please get some more information regarding the referral.  I am not sure who the IBS physician is.  Did the [facility] (infertility doctors) do the polyp removal?  Does she want a referral to them placed?  I generally would not refer to a location ([facility]) but to a physician.

## 2018-04-19 NOTE — TELEPHONE ENCOUNTER
Referral was placed.  Please find out if the patient needs us to mail the referral and assist her with this.  If this is not adequate, then find out exactly what she needs in the letter and who it needs to be sent.

## 2018-04-19 NOTE — TELEPHONE ENCOUNTER
Called patient and informed her of message below.  She thinks the referral will be enough.  If she needs more information for insurance she will call back.  She will come and pick it up at the [facility].  Referral printed and at  for .    Penelope Garza, [facility]  April 19, 2018

## 2018-04-30 ENCOUNTER — PRENATAL OFFICE VISIT (OUTPATIENT)
Dept: OBGYN | Facility: OTHER | Age: 28
End: 2018-04-30
Payer: COMMERCIAL

## 2018-04-30 VITALS
SYSTOLIC BLOOD PRESSURE: 110 MMHG | WEIGHT: 160.25 LBS | HEART RATE: 88 BPM | DIASTOLIC BLOOD PRESSURE: 66 MMHG | BODY MASS INDEX: 29.49 KG/M2

## 2018-04-30 DIAGNOSIS — O09.812 PREGNANCY RESULTING FROM IN VITRO FERTILIZATION IN SECOND TRIMESTER: Primary | ICD-10-CM

## 2018-04-30 PROCEDURE — 99207 ZZC COMPLICATED OB VISIT: CPT | Performed by: OBSTETRICS & GYNECOLOGY

## 2018-04-30 NOTE — NURSING NOTE
"Chief Complaint   Patient presents with     Prenatal Care       Initial /66 (BP Location: Right arm, Patient Position: Chair, Cuff Size: Adult Regular)  Pulse 88  Wt 160 lb 4 oz (72.7 kg)  LMP 10/24/2017 (Approximate)  BMI 29.49 kg/m2 Estimated body mass index is 29.49 kg/(m^2) as calculated from the following:    Height as of 1/9/18: 5' 1.81\" (1.57 m).    Weight as of this encounter: 160 lb 4 oz (72.7 kg).  Medication Reconciliation: complete     Penelope Garza, [facility]  April 30, 2018          "

## 2018-04-30 NOTE — MR AVS SNAPSHOT
After Visit Summary   4/30/2018    Jacqueline Bolden    MRN: [id number]           Patient Information     Date Of Birth          1990        Visit Information        Provider Department      4/30/2018 8:00 AM Anna Norris MD [facility]        Today's Diagnoses     Pregnancy resulting from in vitro fertilization in second trimester    -  1       Follow-ups after your visit        Follow-up notes from your care team     Return in about 4 weeks (around 5/28/2018) for OB Visit.      Who to contact     If you have questions or need follow up information about today's clinic visit or your schedule please contact [facility] directly at [phone].  Normal or non-critical lab and imaging results will be communicated to you by MyChart, letter or phone within 4 business days after the clinic has received the results. If you do not hear from us within 7 days, please contact the clinic through [employer] or phone. If you have a critical or abnormal lab result, we will notify you by phone as soon as possible.  Submit refill requests through [employer]) or call your pharmacy and they will forward the refill request to us. Please allow 3 business days for your refill to be completed.          Additional Information About Your Visit        MyChart Information     [employer] gives you secure access to your electronic health record. If you see a primary care provider, you can also send messages to your care team and make appointments. If you have questions, please call your primary care clinic.  If you do not have a primary care provider, please call [phone] and they will assist you.        Care EveryWhere ID     This is your Care EveryWhere ID. This could be used by other organizations to access your [place] medical records  [id number]        Your Vitals Were     Pulse Last Period BMI (Body Mass Index)             88 10/24/2017 (Approximate) 29.49 kg/m2          Blood  Pressure from Last 3 Encounters:   04/30/18 110/66   03/29/18 110/70   01/09/18 116/70    Weight from Last 3 Encounters:   04/30/18 160 lb 4 oz (72.7 kg)   03/29/18 161 lb 8 oz (73.3 kg)   01/09/18 166 lb (75.3 kg)              Today, you had the following     No orders found for display       Primary Care Provider Office Phone # Fax #    Jhoana Wen PA-C [phone]       [address] [facility] [address]        Equal Access to Services     [facility]: Hadii aad ku hadasho Soomaali, waaxda luqadaha, qaybta kaalmada adelucioyavinny, beatrice bullock . So [facility] [phone].    ATENCIÓN: Si habla español, tiene a souza disposición servicios gratuitos de asistencia lingüística. [facility] [phone].    We comply with applicable federal civil rights laws and [place] laws. We do not discriminate on the basis of race, color, national origin, age, disability, sex, sexual orientation, or gender identity.            Thank you!     Thank you for choosing [facility]  for your care. Our goal is always to provide you with excellent care. Hearing back from our patients is one way we can continue to improve our services. Please take a few minutes to complete the written survey that you may receive in the mail after your visit with us. Thank you!             Your Updated Medication List - Protect others around you: Learn how to safely use, store and throw away your medicines at www.disposemymeds.org.          This list is accurate as of 4/30/18  8:26 AM.  Always use your most recent med list.                   Brand Name Dispense Instructions for use Diagnosis    PRENATAL VITAMINS PO           VITAMIN D (CHOLECALCIFEROL) PO      Take by mouth daily

## 2018-04-30 NOTE — PROGRESS NOTES
Presents for routine  appointment.     No complaints aside from some cramping.  Urgency.  No other symptoms of UTI.  Declines UA.    No LOF/VB/Ctxs.    ROS:   and GI  negative.     Please see Prenatal Vitals and Notes Flowsheet for objective data.    A/P:  28 year old  at 15w3d       ICD-10-CM    1. Pregnancy resulting from in vitro fertilization in second trimester O09.812        Level 2 US ordered  Follow up in 4 week(s).      Anna Norris MD

## 2018-05-17 ENCOUNTER — MYC MEDICAL ADVICE (OUTPATIENT)
Dept: OBGYN | Facility: OTHER | Age: 28
End: 2018-05-17

## 2018-05-23 ENCOUNTER — TELEPHONE (OUTPATIENT)
Dept: OBGYN | Facility: OTHER | Age: 28
End: 2018-05-23

## 2018-05-23 NOTE — TELEPHONE ENCOUNTER
Maternal fetal medicine calling [phone] Len.  Needing additional records, her IVF records.   Fax is [phone]

## 2018-05-24 NOTE — TELEPHONE ENCOUNTER
RN attempted MFM and phone rang continuously. Unsure of what records are needed. Patient may need to sign a ADILEEN for MFM to receive additional records. Nicole Hurtado RN, BSN

## 2018-05-29 ENCOUNTER — TRANSFERRED RECORDS (OUTPATIENT)
Dept: HEALTH INFORMATION MANAGEMENT | Facility: CLINIC | Age: 28
End: 2018-05-29

## 2018-05-29 NOTE — TELEPHONE ENCOUNTER
Contacted [facility] and spoke with Len, she stated they already received what they needed for the patients appointment today.

## 2018-06-22 ENCOUNTER — TRANSFERRED RECORDS (OUTPATIENT)
Dept: HEALTH INFORMATION MANAGEMENT | Facility: CLINIC | Age: 28
End: 2018-06-22

## 2018-07-19 ENCOUNTER — PRENATAL OFFICE VISIT (OUTPATIENT)
Dept: OBGYN | Facility: OTHER | Age: 28
End: 2018-07-19
Payer: COMMERCIAL

## 2018-07-19 VITALS
SYSTOLIC BLOOD PRESSURE: 120 MMHG | DIASTOLIC BLOOD PRESSURE: 70 MMHG | HEART RATE: 86 BPM | BODY MASS INDEX: 32.85 KG/M2 | WEIGHT: 178.5 LBS

## 2018-07-19 DIAGNOSIS — O09.812 PREGNANCY RESULTING FROM IN VITRO FERTILIZATION IN SECOND TRIMESTER: Primary | ICD-10-CM

## 2018-07-19 PROBLEM — O99.019 ANEMIA AFFECTING PREGNANCY: Status: ACTIVE | Noted: 2018-07-19

## 2018-07-19 LAB
GLUCOSE 1H P 50 G GLC PO SERPL-MCNC: 109 MG/DL (ref 60–129)
HGB BLD-MCNC: 10 G/DL (ref 11.7–15.7)

## 2018-07-19 PROCEDURE — 36415 COLL VENOUS BLD VENIPUNCTURE: CPT | Performed by: OBSTETRICS & GYNECOLOGY

## 2018-07-19 PROCEDURE — 00000218 ZZHCL STATISTIC OBHBG - HEMOGLOBIN: Performed by: OBSTETRICS & GYNECOLOGY

## 2018-07-19 PROCEDURE — 86780 TREPONEMA PALLIDUM: CPT | Performed by: OBSTETRICS & GYNECOLOGY

## 2018-07-19 PROCEDURE — 99207 ZZC COMPLICATED OB VISIT: CPT | Performed by: OBSTETRICS & GYNECOLOGY

## 2018-07-19 PROCEDURE — 82950 GLUCOSE TEST: CPT | Performed by: OBSTETRICS & GYNECOLOGY

## 2018-07-19 NOTE — NURSING NOTE
"Chief Complaint   Patient presents with     Prenatal Care       Initial /70 (BP Location: Right arm, Patient Position: Chair, Cuff Size: Adult Regular)  Pulse 86  Wt 178 lb 8 oz (81 kg)  LMP 10/24/2017 (Approximate)  BMI 32.85 kg/m2 Estimated body mass index is 32.85 kg/(m^2) as calculated from the following:    Height as of 18: 5' 1.81\" (1.57 m).    Weight as of this encounter: 178 lb 8 oz (81 kg).  BP completed using cuff size: regular        Penelope Garza, MIGUELANGEL  2018          " no

## 2018-07-19 NOTE — PROGRESS NOTES
Presents for routine  appointment.  I have not seen this patient since 15 weeks.  She has been seen for her MFM appointments.       No complaints.    No LOF/VB/Ctxs.    ROS:   and GI  negative.     Please see Prenatal Vitals and Notes Flowsheet for objective data.    A/P:  28 year old  at 26w6d       ICD-10-CM    1. Pregnancy resulting from in vitro fertilization in second trimester O09.812 Glucose tolerance, gest screen, 1 hour     Treponema Abs w Reflex to RPR and Titer     OB hemoglobin       1 hr glucola today.  She is Rh Positive   TDAP  next visit.    Discussed signs and symptoms of  labor  Discussed appropriate weight gain, diet, and exercise   Follow up in 4  weeks.      Anna Norris MD

## 2018-07-19 NOTE — PATIENT INSTRUCTIONS
SIGNS OF  LABOR    Labor is  if it happens more than three weeks before your due date.    It can be hard to know if you are in labor, since the symptoms can be like the normal feelings of pregnancy.  Often, the only difference is the symptoms increase or they don't go away.     Signs of  labor can include:    Change in your vaginal discharge:  You will have more vaginal discharge when you are pregnant and it should be creamy white.  Call the clinic right away if your discharge has a foul odor, pink or bloody,  or if it becomes watery or is more than is normal for you during your pregnancy.    More than 5-6 contractions or tightenings per hour.  Contractions can feel like period cramps or bowel (gas or diarrhea) pain.  You will feel it in the lower part of your abdomen, in your back or as a pressure feeling in your bottom.  It is often regular, coming for 30 seconds or a minute and then going away, only to come back 5 or 10 minutes later. Some contractions are normal during pregnancy, but if you are feeling more than 5-6 in one hour, empty your bladder, then drink 16-24 ounces of water, eat a snack and lay down on your left side. Put your hand on your abdomen to count the contractions.  If after one hour of resting you have still had 5-6 contractions call your clinic.

## 2018-07-19 NOTE — MR AVS SNAPSHOT
After Visit Summary   2018    Jacqueline Bolden    MRN: [id number]           Patient Information     Date Of Birth          1990        Visit Information        Provider Department      2018 9:15 AM Anna Norris MD [facility]        Today's Diagnoses     Pregnancy resulting from in vitro fertilization in second trimester    -  1      Care Instructions    SIGNS OF  LABOR    Labor is  if it happens more than three weeks before your due date.    It can be hard to know if you are in labor, since the symptoms can be like the normal feelings of pregnancy.  Often, the only difference is the symptoms increase or they don't go away.     Signs of  labor can include:    Change in your vaginal discharge:  You will have more vaginal discharge when you are pregnant and it should be creamy white.  Call the clinic right away if your discharge has a foul odor, pink or bloody,  or if it becomes watery or is more than is normal for you during your pregnancy.    More than 5-6 contractions or tightenings per hour.  Contractions can feel like period cramps or bowel (gas or diarrhea) pain.  You will feel it in the lower part of your abdomen, in your back or as a pressure feeling in your bottom.  It is often regular, coming for 30 seconds or a minute and then going away, only to come back 5 or 10 minutes later. Some contractions are normal during pregnancy, but if you are feeling more than 5-6 in one hour, empty your bladder, then drink 16-24 ounces of water, eat a snack and lay down on your left side. Put your hand on your abdomen to count the contractions.  If after one hour of resting you have still had 5-6 contractions call your clinic.              Follow-ups after your visit        Follow-up notes from your care team     Return in about 4 weeks (around 2018) for OB Visit.      Your next 10 appointments already scheduled     Aug 16, 2018  2:00 PM CDT    [facility]    [address] [facility] [address] [phone]              Who to contact     If you have questions or need follow up information about today's clinic visit or your schedule please contact [facility] directly at [phone].  Normal or non-critical lab and imaging results will be communicated to you by MyChart, letter or phone within 4 business days after the clinic has received the results. If you do not hear from us within 7 days, please contact the clinic through [employer] or phone. If you have a critical or abnormal lab result, we will notify you by phone as soon as possible.  Submit refill requests through [employer] or call your pharmacy and they will forward the refill request to us. Please allow 3 business days for your refill to be completed.          Additional Information About Your Visit        [email or web address] Information     [employer] gives you secure access to your electronic health record. If you see a primary care provider, you can also send messages to your care team and make appointments. If you have questions, please call your primary care clinic.  If you do not have a primary care provider, please call [phone] and they will assist you.        Care EveryWhere ID     This is your Care EveryWhere ID. This could be used by other organizations to access your [place] medical records  [id number]        Your Vitals Were     Pulse Last Period BMI (Body Mass Index)             86 10/24/2017 (Approximate) 32.85 kg/m2          Blood Pressure from Last 3 Encounters:   07/19/18 120/70   04/30/18 110/66   03/29/18 110/70    Weight from Last 3 Encounters:   07/19/18 178 lb 8 oz (81 kg)   04/30/18 160 lb 4 oz (72.7 kg)   03/29/18 161 lb 8 oz (73.3 kg)              We Performed the Following     Glucose tolerance, gest screen, 1 hour     OB hemoglobin     Treponema Abs w Reflex to RPR  and [facility]        Primary Care Provider Office Phone # Fax #    Jhoana Wen PA-C [phone] [address] [facility] [address] [facility] [address]        Equal Access to Services     ARYA ZAMUDIO : Hadii elias ku hadkatelyno Soomaali, waaxda luqadaha, qaybta kaalmada adezinada, beatrice sewell laDimaskylar hobson. So [facility] [phone].    ATENCIÓN: Si habla español, tiene a souza disposición servicios gratuitos de asistencia lingüística. Llame al [phone].    We comply with applicable federal civil rights laws and [place] laws. We do not discriminate on the basis of race, color, national origin, age, disability, sex, sexual orientation, or gender identity.            Thank you!     Thank you for choosing [facility]  for your care. Our goal is always to provide you with excellent care. Hearing back from our patients is one way we can continue to improve our services. Please take a few minutes to complete the written survey that you may receive in the mail after your visit with us. Thank you!             Your Updated Medication List - Protect others around you: Learn how to safely use, store and throw away your medicines at www.disposemymeds.org.          This list is accurate as of 7/19/18 10:29 AM.  Always use your most recent med list.                   Brand Name Dispense Instructions for use Diagnosis    PRENATAL VITAMINS PO           VITAMIN D (CHOLECALCIFEROL) PO      Take by mouth daily

## 2018-07-20 LAB — T PALLIDUM AB SER QL: NONREACTIVE

## 2018-08-06 ENCOUNTER — MYC MEDICAL ADVICE (OUTPATIENT)
Dept: OBGYN | Facility: OTHER | Age: 28
End: 2018-08-06

## 2018-08-06 NOTE — TELEPHONE ENCOUNTER
Responded via [employer]. Nicole Hurtado RN, BSN   MM to review and advise on when the patient should stop traveling, and if there are additional restrictions to add. Nicole Hurtado RN, BSN

## 2018-08-07 NOTE — TELEPHONE ENCOUNTER
[employer] message read 8/7/2018  3:55 AM by Jacqueline Bolden. No response at this time. .  Next 5 appointments (look out 90 days)     Aug 16, 2018  2:00 PM CDT   ESTABL[facility] PRENATAL with [facility] MD   [facility]    [address] [facility] [address]   [phone]                Will close encounter at this time. Nicole Hurtado, RN, BSN

## 2018-08-16 ENCOUNTER — PRENATAL OFFICE VISIT (OUTPATIENT)
Dept: OBGYN | Facility: OTHER | Age: 28
End: 2018-08-16
Payer: COMMERCIAL

## 2018-08-16 VITALS
BODY MASS INDEX: 34.6 KG/M2 | SYSTOLIC BLOOD PRESSURE: 110 MMHG | WEIGHT: 188 LBS | HEART RATE: 84 BPM | DIASTOLIC BLOOD PRESSURE: 60 MMHG

## 2018-08-16 DIAGNOSIS — O99.013 ANEMIA AFFECTING PREGNANCY IN THIRD TRIMESTER: Primary | ICD-10-CM

## 2018-08-16 LAB
ERYTHROCYTE [DISTWIDTH] IN BLOOD BY AUTOMATED COUNT: 13.9 % (ref 10–15)
HCT VFR BLD AUTO: 31.4 % (ref 35–47)
HGB BLD-MCNC: 9.9 G/DL (ref 11.7–15.7)
IRON SATN MFR SERPL: 8 % (ref 15–46)
IRON SERPL-MCNC: 42 UG/DL (ref 35–180)
MCH RBC QN AUTO: 25.7 PG (ref 26.5–33)
MCHC RBC AUTO-ENTMCNC: 31.5 G/DL (ref 31.5–36.5)
MCV RBC AUTO: 82 FL (ref 78–100)
PLATELET # BLD AUTO: 285 10E9/L (ref 150–450)
RBC # BLD AUTO: 3.85 10E12/L (ref 3.8–5.2)
TIBC SERPL-MCNC: 539 UG/DL (ref 240–430)
TRANSFERRIN SERPL-MCNC: 460 MG/DL (ref 210–360)
WBC # BLD AUTO: 11.4 10E9/L (ref 4–11)

## 2018-08-16 PROCEDURE — 83550 IRON BINDING TEST: CPT | Performed by: OBSTETRICS & GYNECOLOGY

## 2018-08-16 PROCEDURE — 85027 COMPLETE CBC AUTOMATED: CPT | Performed by: OBSTETRICS & GYNECOLOGY

## 2018-08-16 PROCEDURE — 99207 ZZC COMPLICATED OB VISIT: CPT | Performed by: OBSTETRICS & GYNECOLOGY

## 2018-08-16 PROCEDURE — 36415 COLL VENOUS BLD VENIPUNCTURE: CPT | Performed by: OBSTETRICS & GYNECOLOGY

## 2018-08-16 PROCEDURE — 84466 ASSAY OF TRANSFERRIN: CPT | Performed by: OBSTETRICS & GYNECOLOGY

## 2018-08-16 PROCEDURE — 83540 ASSAY OF IRON: CPT | Performed by: OBSTETRICS & GYNECOLOGY

## 2018-08-16 NOTE — PROGRESS NOTES
Presents for routine  appointment.    Some shortness of breath. Occasional palpitations.  No chest pain or pressure.  No lightheadedness or dizziness.  Symptoms are intermittent and not associated with anything in particular.  No LOF/VB/Ctxs.    ROS:   and GI  negative.     Please see Prenatal Vitals and Notes Flowsheet for objective data.    Heart: Regular rate and rhythm.  Normal S1, S2.  Mild heart murmur consistent with pregnant state.  Lungs: Clear to auscultation bilaterally.  No rhonchi or wheezes.    A/P:  28 year old  at 30w6d       ICD-10-CM    1. Anemia affecting pregnancy in third trimester O99.013 CBC with platelets     Transferrin     Iron and iron binding capacity         Discussed shortness of breath.  Offered cardiology referral.  Occasionally anemia can contribute to shortness of breath, in addition to the third trimester of pregnancy.  Cardiac pathology would be rare.  Patient is okay with holding off on cardiology referral at this time.  She will let me know if her symptoms worsen or do not improve.  IVF pregnancy.  Plan growth ultrasound next visit.  GCT Normal  Hgb 10.0.  She had switched to PNV that contained iron recently.  We will repeat labs today to make sure her hemoglobin is improving.  We may need to consider IV iron.  TDAP next visit.    Rhogam: not  needed  Discussed kick counts   Patient is breast feeding.  Questions: none  Follow up in 2 weeks.      Anna Norris MD

## 2018-08-16 NOTE — NURSING NOTE
"Chief Complaint   Patient presents with     Prenatal Care       Initial /60 (BP Location: Right arm, Patient Position: Chair, Cuff Size: Adult Regular)  Pulse 84  Wt 188 lb (85.3 kg)  LMP 10/24/2017 (Approximate)  BMI 34.6 kg/m2 Estimated body mass index is 34.6 kg/(m^2) as calculated from the following:    Height as of 18: 5' 1.81\" (1.57 m).    Weight as of this encounter: 188 lb (85.3 kg).  BP completed using cuff size: regular        Penelope Garza, [facility]  2018          "

## 2018-08-16 NOTE — MR AVS SNAPSHOT
After Visit Summary   8/16/2018    Jacqueline Bolden    MRN: [id number]           Patient Information     Date Of Birth          1990        Visit Information        Provider Department      8/16/2018 2:00 PM Anna Norris MD [facility]        Today's Diagnoses     Anemia affecting pregnancy in third trimester    -  1       Follow-ups after your visit        Follow-up notes from your care team     Return in about 2 weeks (around 8/30/2018) for OB Visit.      Who to contact     If you have questions or need follow up information about today's clinic visit or your schedule please contact [facility] directly at [phone].  Normal or non-critical lab and imaging results will be communicated to you by MyChart, letter or phone within 4 business days after the clinic has received the results. If you do not hear from us within 7 days, please contact the clinic through [employer] or phone. If you have a critical or abnormal lab result, we will notify you by phone as soon as possible.  Submit refill requests through [employer] or call your pharmacy and they will forward the refill request to us. Please allow 3 business days for your refill to be completed.          Additional Information About Your Visit        MyChart Information     [employer] gives you secure access to your electronic health record. If you see a primary care provider, you can also send messages to your care team and make appointments. If you have questions, please call your primary care clinic.  If you do not have a primary care provider, please call [phone] and they will assist you.        Care EveryWhere ID     This is your Care EveryWhere ID. This could be used by other organizations to access your [place] medical records  [id number]        Your Vitals Were     Pulse Last Period BMI (Body Mass Index)             84 10/24/2017 (Approximate) 34.6 kg/m2          Blood Pressure from Last 3  Encounters:   08/16/18 110/60   07/19/18 120/70   04/30/18 110/66    Weight from Last 3 Encounters:   08/16/18 188 lb (85.3 kg)   07/19/18 178 lb 8 oz (81 kg)   04/30/18 160 lb 4 oz (72.7 kg)              We Performed the Following     CBC with platelets     Iron and iron binding capacity     Transferrin        Primary Care Provider Office Phone # Fax #    Jhoana Wen PA-C [phone] [address] [place] [address]        Equal Access to Services     [facility]: Hadii aad ku hadasho Soomaali, waaxda luqadaha, qaybta kaalmada ivan, beatrice bullock . So [facility] [phone].    ATENCIÓN: Si habla español, tiene a souza disposición servicios gratuitos de asistencia lingüística. [facility] [phone].    We comply with applicable federal civil rights laws and [place] laws. We do not discriminate on the basis of race, color, national origin, age, disability, sex, sexual orientation, or gender identity.            Thank you!     Thank you for choosing [facility]  for your care. Our goal is always to provide you with excellent care. Hearing back from our patients is one way we can continue to improve our services. Please take a few minutes to complete the written survey that you may receive in the mail after your visit with us. Thank you!             Your Updated Medication List - Protect others around you: Learn how to safely use, store and throw away your medicines at www.disposemymeds.org.          This list is accurate as of 8/16/18  6:55 PM.  Always use your most recent med list.                   Brand Name Dispense Instructions for use Diagnosis    PRENATAL VITAMINS PO           VITAMIN D (CHOLECALCIFEROL) PO      Take by mouth daily

## 2018-08-16 NOTE — PROGRESS NOTES
"Chief Complaint   Patient presents with     Prenatal Care       Initial LMP 10/24/2017 (Approximate) Estimated body mass index is 32.85 kg/(m^2) as calculated from the following:    Height as of 18: 5' 1.81\" (1.57 m).    Weight as of 18: 178 lb 8 oz (81 kg).  BP completed using cuff size: regular        Penelope Garza, MIGUELANGEL  2018          "

## 2018-08-17 ENCOUNTER — TELEPHONE (OUTPATIENT)
Dept: OBGYN | Facility: CLINIC | Age: 28
End: 2018-08-17

## 2018-08-17 ENCOUNTER — MYC MEDICAL ADVICE (OUTPATIENT)
Dept: OBGYN | Facility: OTHER | Age: 28
End: 2018-08-17

## 2018-08-17 DIAGNOSIS — O99.013 ANEMIA AFFECTING PREGNANCY IN THIRD TRIMESTER: ICD-10-CM

## 2018-08-17 DIAGNOSIS — O99.013 ANEMIA AFFECTING PREGNANCY IN THIRD TRIMESTER: Primary | ICD-10-CM

## 2018-08-17 LAB — FERRITIN SERPL-MCNC: 7 NG/ML (ref 12–150)

## 2018-08-17 PROCEDURE — 82728 ASSAY OF FERRITIN: CPT | Performed by: OBSTETRICS & GYNECOLOGY

## 2018-08-17 PROCEDURE — 36415 COLL VENOUS BLD VENIPUNCTURE: CPT | Performed by: OBSTETRICS & GYNECOLOGY

## 2018-08-17 RX ORDER — ACETAMINOPHEN 325 MG/1
650 TABLET ORAL
Status: CANCELLED
Start: 2018-08-17

## 2018-08-17 RX ORDER — DIPHENHYDRAMINE HCL 25 MG
25 CAPSULE ORAL
Status: CANCELLED | OUTPATIENT
Start: 2018-08-17

## 2018-08-17 NOTE — TELEPHONE ENCOUNTER
Phone call to patient. Explained orders per Dr. Norris and gave patient phone number to call to schedule. Patient agreed to follow plan and appreciative of f/u. Sarah Acosta RN, BAN

## 2018-08-17 NOTE — TELEPHONE ENCOUNTER
Patient notified of results and recommendation for IV iron infusion.  Orders placed.  Please notify patient.  She should plan for the 3 infusions, not just one.

## 2018-08-27 ENCOUNTER — PRENATAL OFFICE VISIT (OUTPATIENT)
Dept: OBGYN | Facility: OTHER | Age: 28
End: 2018-08-27
Payer: COMMERCIAL

## 2018-08-27 VITALS
DIASTOLIC BLOOD PRESSURE: 68 MMHG | HEART RATE: 104 BPM | BODY MASS INDEX: 35.33 KG/M2 | SYSTOLIC BLOOD PRESSURE: 102 MMHG | WEIGHT: 192 LBS

## 2018-08-27 DIAGNOSIS — O09.813 PREGNANCY RESULTING FROM IN VITRO FERTILIZATION IN THIRD TRIMESTER: Primary | ICD-10-CM

## 2018-08-27 DIAGNOSIS — Z23 NEED FOR TDAP VACCINATION: ICD-10-CM

## 2018-08-27 PROCEDURE — 90715 TDAP VACCINE 7 YRS/> IM: CPT | Performed by: OBSTETRICS & GYNECOLOGY

## 2018-08-27 PROCEDURE — 99207 ZZC PRENATAL VISIT: CPT | Performed by: OBSTETRICS & GYNECOLOGY

## 2018-08-27 PROCEDURE — 90471 IMMUNIZATION ADMIN: CPT | Performed by: OBSTETRICS & GYNECOLOGY

## 2018-08-27 NOTE — PROGRESS NOTES
28 year old  at 32w3d weeks presents to the clinic for a routine prenatal visit.    No concerns.  Patient denies any vaginal bleeding, leakage of fluid, or contractions     Good fetal movement  Fundal height=35cm  S>D.   Will get a growth ultrasound   MCRj=787  Hgb=9.9.  Patient has her first Iron infusion next week  TDAP today  Discussed labor precautions.  RTC 2 weeks    Ana Lilia Danielle

## 2018-08-27 NOTE — MR AVS SNAPSHOT
After Visit Summary   2018    Jacqueline Bolden    MRN: [id number]           Patient Information     Date Of Birth          1990        Visit Information        Provider Department      2018 9:45 AM Ana Lilia Danielle DO [facility]        Today's Diagnoses     Pregnancy resulting from in vitro fertilization in third trimester    -  1      Care Instructions    SIGNS OF  LABOR    Labor is  if it happens more than three weeks before your due date.    It can be hard to know if you are in labor, since the symptoms can be like the normal feelings of pregnancy.  Often, the only difference is the symptoms increase or they don't go away.     Signs of  labor can include:    Change in your vaginal discharge:  You will have more vaginal discharge when you are pregnant and it should be creamy white.  Call the clinic right away if your discharge has a foul odor, pink or bloody,  or if it becomes watery or is more than is normal for you during your pregnancy.    More than 5-6 contractions or tightenings per hour.  Contractions can feel like period cramps or bowel (gas or diarrhea) pain.  You will feel it in the lower part of your abdomen, in your back or as a pressure feeling in your bottom.  It is often regular, coming for 30 seconds or a minute and then going away, only to come back 5 or 10 minutes later. Some contractions are normal during pregnancy, but if you are feeling more than 5-6 in one hour, empty your bladder, then drink 16-24 ounces of water, eat a snack and lay down on your left side. Put your hand on your abdomen to count the contractions.  If after one hour of resting you have still had 5-6 contractions call your clinic.          Follow-ups after your visit        Follow-up notes from your care team     Return in about 2 weeks (around 9/10/2018).      Your next 10 appointments already scheduled     Aug 31, 2018 10:00 AM CDT   Level 2 with BAY 7  INFUSION   [facility]    [address] [facility] [address]   [phone]            Sep 10, 2018 10:15 AM CDT   ESTABLISHED PRENATAL with Anna Norris MD   [facility]    [address] [facility] [address]   [phone]            Sep 12, 2018  2:00 PM CDT   Level 2 with BAY 10 INFUSION   [facility]    [address] [facility] [address]   [phone]            Sep 14, 2018 10:00 AM CDT   Level 2 with BAY 1 INFUSION   [facility]    [address] [place] [address]   [phone]            Sep 25, 2018  9:00 AM CDT   ESTABLISHED PRENATAL with PERCY Herrera CNM   [facility] ([facility])    [address] [facility] [address]   [phone]              Future tests that were ordered for you today     Open Future Orders        Priority Expected Expires Ordered    US OB Single Follow Up Repeat Routine  11/25/2018 8/27/2018            Who to contact     If you have questions or need follow up information about today's clinic visit or your schedule please contact [facility] directly at [phone].  Normal or non-critical lab and imaging results will be communicated to you by MyChart, letter or phone within 4 business days after the clinic has received the results. If you do not hear from us within 7 days, please contact the clinic through Order Mapperhart or phone. If you have a critical or abnormal lab result, we will notify you by phone as soon as possible.  Submit refill requests through [employer] or call your pharmacy and they will forward the refill request to us. Please allow 3 business days for your refill to be completed.          Additional Information About Your Visit        Order Mapperhart Information     [employer] gives you  secure access to your electronic health record. If you see a primary care provider, you can also send messages to your care team and make appointments. If you have questions, please call your primary care clinic.  If you do not have a primary care provider, please call [phone] and they will assist you.        Care EveryWhere ID     This is your Care EveryWhere ID. This could be used by other organizations to access your [place] medical records  [id number]        Your Vitals Were     Pulse Last Period BMI (Body Mass Index)             104 10/24/2017 (Approximate) 35.33 kg/m2          Blood Pressure from Last 3 Encounters:   08/27/18 102/68   08/16/18 110/60   07/19/18 120/70    Weight from Last 3 Encounters:   08/27/18 192 lb (87.1 kg)   08/16/18 188 lb (85.3 kg)   07/19/18 178 lb 8 oz (81 kg)               Primary Care Provider Office Phone # Fax #    Jhoana Wen PA-C [phone]       [address] [place] [address]        Equal Access to Services     [facility]: Hadii aad ku hadasho Soomaali, waaxda luqadaha, qaybta kaalmada adeegyada, beatrice bullock . So [facility] [phone].    ATENCIÓN: Si habla español, tiene a souza disposición servicios gratuitos de asistencia lingüística. [facility] [phone].    We comply with applicable federal civil rights laws and [place] laws. We do not discriminate on the basis of race, color, national origin, age, disability, sex, sexual orientation, or gender identity.            Thank you!     Thank you for choosing [facility]  for your care. Our goal is always to provide you with excellent care. Hearing back from our patients is one way we can continue to improve our services. Please take a few minutes to complete the written survey that you may receive in the mail after your visit with us. Thank you!             Your Updated Medication List - Protect others around you: Learn how to safely use, store and  throw away your medicines at www.disposemymeds.org.          This list is accurate as of 8/27/18 10:21 AM.  Always use your most recent med list.                   Brand Name Dispense Instructions for use Diagnosis    PRENATAL VITAMINS PO           VITAMIN D (CHOLECALCIFEROL) PO      Take by mouth daily

## 2018-08-29 ENCOUNTER — MYC MEDICAL ADVICE (OUTPATIENT)
Dept: OBGYN | Facility: OTHER | Age: 28
End: 2018-08-29

## 2018-08-29 RX ORDER — BREAST PUMP
1 EACH MISCELLANEOUS CONTINUOUS PRN
Qty: 1 EACH | Refills: 0 | Status: SHIPPED | OUTPATIENT
Start: 2018-08-29 | End: 2020-01-06

## 2018-08-31 ENCOUNTER — INFUSION THERAPY VISIT (OUTPATIENT)
Dept: INFUSION THERAPY | Facility: CLINIC | Age: 28
End: 2018-08-31
Payer: COMMERCIAL

## 2018-08-31 ENCOUNTER — RADIANT APPOINTMENT (OUTPATIENT)
Dept: ULTRASOUND IMAGING | Facility: OTHER | Age: 28
End: 2018-08-31
Attending: OBSTETRICS & GYNECOLOGY
Payer: COMMERCIAL

## 2018-08-31 VITALS
HEART RATE: 104 BPM | OXYGEN SATURATION: 98 % | RESPIRATION RATE: 18 BRPM | DIASTOLIC BLOOD PRESSURE: 61 MMHG | TEMPERATURE: 98.5 F | SYSTOLIC BLOOD PRESSURE: 110 MMHG

## 2018-08-31 DIAGNOSIS — O09.813 PREGNANCY RESULTING FROM IN VITRO FERTILIZATION IN THIRD TRIMESTER: ICD-10-CM

## 2018-08-31 DIAGNOSIS — O99.013 ANEMIA AFFECTING PREGNANCY IN THIRD TRIMESTER: Primary | ICD-10-CM

## 2018-08-31 PROCEDURE — 96366 THER/PROPH/DIAG IV INF ADDON: CPT | Performed by: PHYSICIAN ASSISTANT

## 2018-08-31 PROCEDURE — 96365 THER/PROPH/DIAG IV INF INIT: CPT | Performed by: PHYSICIAN ASSISTANT

## 2018-08-31 PROCEDURE — 76816 OB US FOLLOW-UP PER FETUS: CPT

## 2018-08-31 PROCEDURE — 99207 ZZC NO CHARGE LOS: CPT

## 2018-08-31 RX ORDER — DIPHENHYDRAMINE HCL 25 MG
25 CAPSULE ORAL
Status: CANCELLED | OUTPATIENT
Start: 2018-08-31

## 2018-08-31 RX ORDER — ACETAMINOPHEN 325 MG/1
650 TABLET ORAL
Status: CANCELLED
Start: 2018-08-31

## 2018-08-31 RX ADMIN — Medication 250 ML: at 10:42

## 2018-08-31 NOTE — PROGRESS NOTES
Infusion Nursing Note:  Jacqueline Bolden presents today for Venofer.    Patient seen by provider today: No   present during visit today: Not Applicable.    Note: N/A.    Intravenous Access:  Peripheral IV placed.    Treatment Conditions:  Not Applicable.      Post Infusion Assessment:  Patient tolerated infusion without incident.     Patient will return 9/12/18 for next appointment.   Patient discharged in stable condition accompanied by: self.  Departure Mode: Ambulatory.    Julissa Pak RN

## 2018-08-31 NOTE — MR AVS SNAPSHOT
After Visit Summary   8/31/2018    Jacqueline Bolden    MRN: [id number]           Patient Information     Date Of Birth          1990        Visit Information        Provider Department      8/31/2018 10:00 AM [place] 7 INFUSION [facility]        Today's Diagnoses     Anemia affecting pregnancy in third trimester    -  1       Follow-ups after your visit        Your next 10 appointments already scheduled     Aug 31, 2018  1:30 PM CDT   US OB SINGLE FOLLOW UP REPEAT with ERUS1   [facility]    [address] [place] [address] [phone]           Please bring a list of your medicines (including vitamins, minerals and over-the-counter drugs). Also, tell your doctor about any allergies you may have. Wear comfortable clothes and leave your valuables at home.  Drink four 8-ounce glasses of fluid an hour before your exam. If you need to empty your bladder before your exam, try to release only a little urine. Then, drink another glass of fluid.  You may have up to two family members in the exam room. If you bring a small child, an adult must be there to care for him or her. No video or camera photography during the procedure.  Please call the Imaging Department at your exam site with any questions.            Sep 10, 2018 10:15 AM CDT   ESTABLISHED PRENATAL with Anna Norris MD   [facility]    [address] [place] [address] [phone]            Sep 12, 2018  2:00 PM CDT   Level 2 with BAY 10 INFUSION   [facility]    [address] [facility] [address] [phone]            Sep 14, 2018 10:00 AM CDT   Level 2 with [place] 1 INFUSION   [facility]    [address] [facility] [address] [phone]            Sep 25, 2018  9:00 AM CDT   ESTABLISHED  PRENATAL with PERCY Herrera CNM   [facility]    [address] [facility] [address] [phone]              Who to contact     If you have questions or need follow up information about today's clinic visit or your schedule please contact [facility] directly at [phone].  Normal or non-critical lab and imaging results will be communicated to you by Polwirehart, letter or phone within 4 business days after the clinic has received the results. If you do not hear from us within 7 days, please contact the clinic through Polwirehart or phone. If you have a critical or abnormal lab result, we will notify you by phone as soon as possible.  Submit refill requests through [employer] or call your pharmacy and they will forward the refill request to us. Please allow 3 business days for your refill to be completed.          Additional Information About Your Visit        Adstrix Information     [employer]rix gives you secure access to your electronic health record. If you see a primary care provider, you can also send messages to your care team and make appointments. If you have questions, please call your primary care clinic.  If you do not have a primary care provider, please call [phone] and they will assist you.      Ad[employer]rix is an electronic gateway that provides easy, online access to your medical records. With Adstrix, you can request a clinic appointment, read your test results, renew a prescription or communicate with your care team.     To access your existing account, please contact your [facility] or call [phone] for assistance.        Care EveryWhere ID     This is your Care EveryWhere ID. This could be used by other organizations to access your [place] medical records  [id number]        Your Vitals Were     Pulse Temperature Respirations Last Period Pulse Oximetry       104 98.5  F (36.9  C) (Oral) 18  10/24/2017 (Approximate) 98%        Blood Pressure from Last 3 Encounters:   08/31/18 110/61   08/27/18 102/68   08/16/18 110/60    Weight from Last 3 Encounters:   08/27/18 87.1 kg (192 lb)   08/16/18 85.3 kg (188 lb)   07/19/18 81 kg (178 lb 8 oz)              Today, you had the following     No orders found for display       Primary Care Provider Office Phone # Fax #    Jhoana Wen PA-C [phone]       [address] [facility] [address]        Equal Access to Services     [facility]: Hadii aad ku hadasho Soomaali, waaxda luqadaha, qaybta kaalmada adelucioyada, beatrice bullock . So [facility] [phone].    ATENCIÓN: Si habla español, tiene a souza disposición servicios gratuitos de asistencia lingüística. Llame al [phone].    We comply with applicable federal civil rights laws and [place] laws. We do not discriminate on the basis of race, color, national origin, age, disability, sex, sexual orientation, or gender identity.            Thank you!     Thank you for choosing [facility]  for your care. Our goal is always to provide you with excellent care. Hearing back from our patients is one way we can continue to improve our services. Please take a few minutes to complete the written survey that you may receive in the mail after your visit with us. Thank you!             Your Updated Medication List - Protect others around you: Learn how to safely use, store and throw away your medicines at www.disposemymeds.org.          This list is accurate as of 8/31/18 12:14 PM.  Always use your most recent med list.                   Brand Name Dispense Instructions for use Diagnosis    breast pump Misc     1 each    1 each continuous prn    Lactating mother       PRENATAL VITAMINS PO           VITAMIN D (CHOLECALCIFEROL) PO      Take by mouth daily

## 2018-09-12 ENCOUNTER — INFUSION THERAPY VISIT (OUTPATIENT)
Dept: INFUSION THERAPY | Facility: CLINIC | Age: 28
End: 2018-09-12
Payer: COMMERCIAL

## 2018-09-12 VITALS
DIASTOLIC BLOOD PRESSURE: 71 MMHG | OXYGEN SATURATION: 92 % | RESPIRATION RATE: 18 BRPM | TEMPERATURE: 96.9 F | HEART RATE: 110 BPM | SYSTOLIC BLOOD PRESSURE: 112 MMHG

## 2018-09-12 DIAGNOSIS — O99.013 ANEMIA AFFECTING PREGNANCY IN THIRD TRIMESTER: Primary | ICD-10-CM

## 2018-09-12 PROCEDURE — 96366 THER/PROPH/DIAG IV INF ADDON: CPT | Performed by: INTERNAL MEDICINE

## 2018-09-12 PROCEDURE — 96365 THER/PROPH/DIAG IV INF INIT: CPT | Performed by: INTERNAL MEDICINE

## 2018-09-12 PROCEDURE — 99207 ZZC NO CHARGE NURSE ONLY: CPT

## 2018-09-12 RX ORDER — ACETAMINOPHEN 325 MG/1
650 TABLET ORAL
Status: CANCELLED
Start: 2018-09-12

## 2018-09-12 RX ORDER — DIPHENHYDRAMINE HCL 25 MG
25 CAPSULE ORAL
Status: CANCELLED | OUTPATIENT
Start: 2018-09-12

## 2018-09-12 RX ADMIN — Medication 250 ML: at 14:12

## 2018-09-12 ASSESSMENT — PAIN SCALES - GENERAL: PAINLEVEL: NO PAIN (0)

## 2018-09-12 NOTE — MR AVS SNAPSHOT
After Visit Summary   9/12/2018    Jacqueline Bolden    MRN: [id number]           Patient Information     Date Of Birth          1990        Visit Information        Provider Department      9/12/2018 2:00 PM [place] [facility]        Today's Diagnoses     Anemia affecting pregnancy in third trimester    -  1       Follow-ups after your visit        Your next 10 appointments already scheduled     Sep 14, 2018 10:00 AM CDT   Level 2 with [place] [facility] ([facility])    [address] [place] [id number] [phone]            Sep 14, 2018  2:45 PM CDT   ESTABLISHED PRENATAL with Anna Norris MD   [facility]    [address] [place] [id number] [phone]            Sep 25, 2018  9:00 AM CDT   ESTABLISHED PRENATAL with PERCY Herrera CNM   [facility]    [address] [facility] [id number] [phone]              Who to contact     If you have questions or need follow up information about today's clinic visit or your schedule please contact [facility] directly at [phone].  Normal or non-critical lab and imaging results will be communicated to you by MyChart, letter or phone within 4 business days after the clinic has received the results. If you do not hear from us within 7 days, please contact the clinic through MyChart or phone. If you have a critical or abnormal lab result, we will notify you by phone as soon as possible.  Submit refill requests through Raise or call your pharmacy and they will forward the refill request to us. Please allow 3 business days for your refill to be completed.          Additional Information About Your Visit        "[employer]harVook Information     Raise gives you secure access to your electronic health record. If you see a primary  care provider, you can also send messages to your care team and make appointments. If you have questions, please call your primary care clinic.  If you do not have a primary care provider, please call [phone] and they will assist you.      [employer] is an electronic gateway that provides easy, online access to your medical records. With [employer], you can request a clinic appointment, read your test results, renew a prescription or communicate with your care team.     To access your existing account, please contact your [facility] or call [phone] for assistance.        Care EveryWhere ID     This is your Care EveryWhere ID. This could be used by other organizations to access your [place] medical records  [id number]        Your Vitals Were     Pulse Temperature Respirations Last Period Pulse Oximetry       110 96.9  F (36.1  C) 18 10/24/2017 (Approximate) 92%        Blood Pressure from Last 3 Encounters:   09/12/18 112/71   08/31/18 110/61   08/27/18 102/68    Weight from Last 3 Encounters:   08/27/18 87.1 kg (192 lb)   08/16/18 85.3 kg (188 lb)   07/19/18 81 kg (178 lb 8 oz)              Today, you had the following     No orders found for display       Primary Care Provider Office Phone # Fax #    Jhoana Wen PA-C [phone]       [address]  [facility] [address]        Equal Access to Services     ARYA ZAMUDIO : Hadii aad ku hadasho Soomaali, waaxda luqadaha, qaybta kaalmada adeegyada, beatrice bullock . So [facility] [phone].    ATENCIÓN: Si habla español, tiene a souza disposición servicios gratuitos de asistencia lingüística. Manuelame al [phone].    We comply with applicable federal civil rights laws and [place] laws. We do not discriminate on the basis of race, color, national origin, age, disability, sex, sexual orientation, or gender identity.            Thank you!     Thank you for choosing [facility]   for your care. Our goal is always to provide you with excellent care. Hearing back from our patients is one way we can continue to improve our services. Please take a few minutes to complete the written survey that you may receive in the mail after your visit with us. Thank you!             Your Updated Medication List - Protect others around you: Learn how to safely use, store and throw away your medicines at www.disposemymeds.org.          This list is accurate as of 9/12/18  3:58 PM.  Always use your most recent med list.                   Brand Name Dispense Instructions for use Diagnosis    breast pump Misc     1 each    1 each continuous prn    Lactating mother       PRENATAL VITAMINS PO           VITAMIN D (CHOLECALCIFEROL) PO      Take by mouth daily

## 2018-09-12 NOTE — PROGRESS NOTES
Infusion Nursing Note:  Jacqueline Bolden presents today for #2 of 3 Venofer infusion.  Patient seen by provider today: No   present during visit today: Not Applicable.    Note: Pt states she is doing well, denies any problems with her last Venofer infusion.  Pt states she has decreased her ice chewing from 7 cups a day down to 2-3 since starting Venofer last week.    Intravenous Access:  Peripheral IV placed.    Treatment Conditions:  Not Applicable.      Post Infusion Assessment:  Patient tolerated infusion without incident.  Blood return noted pre and post infusion.  Site patent and intact, free from redness, edema or discomfort.  No evidence of extravasations.  Access discontinued per protocol.    Discharge Plan:   Return for 3rd Venofer infusion on 09/14/18  Discharge instructions reviewed with: Patient.  Patient and/or family verbalized understanding of discharge instructions and all questions answered.  Patient discharged in stable condition accompanied by: self.  Departure Mode: Ambulatory.    Zakiya Baker RN

## 2018-09-14 ENCOUNTER — PRENATAL OFFICE VISIT (OUTPATIENT)
Dept: OBGYN | Facility: CLINIC | Age: 28
End: 2018-09-14
Payer: COMMERCIAL

## 2018-09-14 ENCOUNTER — INFUSION THERAPY VISIT (OUTPATIENT)
Dept: INFUSION THERAPY | Facility: CLINIC | Age: 28
End: 2018-09-14
Payer: COMMERCIAL

## 2018-09-14 VITALS
WEIGHT: 198.9 LBS | BODY MASS INDEX: 36.6 KG/M2 | HEART RATE: 101 BPM | DIASTOLIC BLOOD PRESSURE: 72 MMHG | SYSTOLIC BLOOD PRESSURE: 119 MMHG

## 2018-09-14 DIAGNOSIS — O99.013 ANEMIA AFFECTING PREGNANCY IN THIRD TRIMESTER: ICD-10-CM

## 2018-09-14 DIAGNOSIS — O99.013 ANEMIA AFFECTING PREGNANCY IN THIRD TRIMESTER: Primary | ICD-10-CM

## 2018-09-14 DIAGNOSIS — O09.813 PREGNANCY RESULTING FROM IN VITRO FERTILIZATION IN THIRD TRIMESTER: Primary | ICD-10-CM

## 2018-09-14 PROCEDURE — 96365 THER/PROPH/DIAG IV INF INIT: CPT | Performed by: INTERNAL MEDICINE

## 2018-09-14 PROCEDURE — 99207 ZZC NO CHARGE NURSE ONLY: CPT

## 2018-09-14 PROCEDURE — 96366 THER/PROPH/DIAG IV INF ADDON: CPT | Performed by: INTERNAL MEDICINE

## 2018-09-14 PROCEDURE — 99207 ZZC COMPLICATED OB VISIT: CPT | Performed by: OBSTETRICS & GYNECOLOGY

## 2018-09-14 RX ORDER — DIPHENHYDRAMINE HCL 25 MG
25 CAPSULE ORAL
Status: CANCELLED | OUTPATIENT
Start: 2018-09-14

## 2018-09-14 RX ORDER — ACETAMINOPHEN 325 MG/1
650 TABLET ORAL
Status: CANCELLED
Start: 2018-09-14

## 2018-09-14 RX ADMIN — Medication 250 ML: at 10:38

## 2018-09-14 NOTE — PROGRESS NOTES
Presents for routine  appointment.     No complaints, aside from general discomfort.    No LOF/VB/Ctxs.    ROS:   and GI negative.     Please see Prenatal Vitals and Notes Flowsheet for objective data.    EFW 64% at 34w 5d.       A/P:  28 year old  at 35w0d       ICD-10-CM    1. Pregnancy resulting from in vitro fertilization in third trimester O09.813    2. Anemia affecting pregnancy in third trimester O99.013        Anemia - IV iron today.  Hgb next visit.    Reportable signs and symptoms discussed  Group B Strep next visit  Follow up in 1 weeks.      Anna Norris MD

## 2018-09-14 NOTE — MR AVS SNAPSHOT
After Visit Summary   9/14/2018    Jacqueline Bolden    MRN: [id number]           Patient Information     Date Of Birth          1990        Visit Information        Provider Department      9/14/2018 10:00 AM [address]AY 99 INFUSION [facility]        Today's Diagnoses     Anemia affecting pregnancy in third trimester    -  1       Follow-ups after your visit        Your next 10 appointments already scheduled     Sep 14, 2018  2:45 PM CDT   ESTABLISHED PRENATAL with Anna Norris MD   [facility] ([facility])    [address] [place] [id number] [phone]            Sep 25, 2018  9:00 AM CDT   ESTABLISHED PRENATAL with PERCY Herrera CNM   [facility]    [address] [facility] [id number] [phone]              Who to contact     If you have questions or need follow up information about today's clinic visit or your schedule please contact [facility] directly at [phone].  Normal or non-critical lab and imaging results will be communicated to you by Online Prasadhart, letter or phone within 4 business days after the clinic has received the results. If you do not hear from us within 7 days, please contact the clinic through Online Prasadhart or phone. If you have a critical or abnormal lab result, we will notify you by phone as soon as possible.  Submit refill requests through Ask.com or call your pharmacy and they will forward the refill request to us. Please allow 3 business days for your refill to be completed.          Additional Information About Your Visit        MyChart Information     Ask.com gives you secure access to your electronic health record. If you see a primary care provider, you can also send messages to your care team and make appointments. If you have questions, please call your primary care clinic.  If you do not have a primary care provider,  please call [phone] and they will assist you.      Semasio is an electronic gateway that provides easy, online access to your medical records. With [employer], you can request a clinic appointment, read your test results, renew a prescription or communicate with your care team.     To access your existing account, please contact your [facility] or call [phone] for assistance.        Care EveryWhere ID     This is your Care EveryWhere ID. This could be used by other organizations to access your [place] medical records  [id number]        Your Vitals Were     Last Period                   10/24/2017 (Approximate)            Blood Pressure from Last 3 Encounters:   09/12/18 112/71   08/31/18 110/61   08/27/18 102/68    Weight from Last 3 Encounters:   08/27/18 87.1 kg (192 lb)   08/16/18 85.3 kg (188 lb)   07/19/18 81 kg (178 lb 8 oz)              Today, you had the following     No orders found for display       Primary Care Provider Office Phone # Fax #    Jhoana Wen PA-C [phone]       [address] [place] [address]        Equal Access to Services     [facility]: Hadii aad ku hadasho Soomaali, waaxda luqadaha, qaybta kaalmada adeegyada, beatrice daniels hayaravindn zenaida bullock . So [facility] [phone].    ATENCIÓN: Si habla español, tiene a souza disposición servicios gratuitos de asistencia lingüística. Llame al [phone].    We comply with applicable federal civil rights laws and [place] laws. We do not discriminate on the basis of race, color, national origin, age, disability, sex, sexual orientation, or gender identity.            Thank you!     Thank you for choosing [facility]  for your care. Our goal is always to provide you with excellent care. Hearing back from our patients is one way we can continue to improve our services. Please take a few minutes to complete the written survey that you may receive in the mail after  your visit with us. Thank you!             Your Updated Medication List - Protect others around you: Learn how to safely use, store and throw away your medicines at www.disposemymeds.org.          This list is accurate as of 9/14/18  1:39 PM.  Always use your most recent med list.                   Brand Name Dispense Instructions for use Diagnosis    breast pump Misc     1 each    1 each continuous prn    Lactating mother       PRENATAL VITAMINS PO           VITAMIN D (CHOLECALCIFEROL) PO      Take by mouth daily

## 2018-09-14 NOTE — LETTER
[facility]  [address] [place] [address]  Phone: [phone]    09/14/18    Jacqueline Bolden  [address] [facility] [address]      To whom it may concern:     Jacqueline Bolden is pregnant with an Estimated Date of Delivery: Oct 19, 2018.  There is a possibility she can go into labor within 3 weeks of her due date and she should not do client visits.      Sincerely,      Anna Norris MD

## 2018-09-14 NOTE — NURSING NOTE
"Chief Complaint   Patient presents with     Prenatal Care       Initial /72 (BP Location: Right arm, Patient Position: Chair, Cuff Size: Adult Regular)  Pulse 101  Wt 198 lb 14.4 oz (90.2 kg)  LMP 10/24/2017 (Approximate)  BMI 36.6 kg/m2 Estimated body mass index is 36.6 kg/(m^2) as calculated from the following:    Height as of 18: 5' 1.81\" (1.57 m).    Weight as of this encounter: 198 lb 14.4 oz (90.2 kg).  BP completed using cuff size: regular        Penelope Garza, [facility]  2018          "

## 2018-09-14 NOTE — PROGRESS NOTES
Infusion Nursing Note:  Jacqueline Bolden presents today for venofer #3/3.    Patient seen by provider today: No   present during visit today: Not Applicable.    Intravenous Access:  Peripheral IV placed.    Treatment Conditions:  No medical concerns. See systems review flow sheet.      Post Infusion Assessment:  Patient tolerated infusion without incident.  Blood return noted pre and post infusion.  Site patent and intact, free from redness, edema or discomfort.  No evidence of extravasations.  Access discontinued per protocol.    Discharge Plan:   Patient discharged in stable condition accompanied by: self.  Departure Mode: Ambulatory.  Verbally reviewed her prenatal appointments.    Anna Baker RN

## 2018-09-20 ENCOUNTER — PRENATAL OFFICE VISIT (OUTPATIENT)
Dept: OBGYN | Facility: OTHER | Age: 28
End: 2018-09-20
Payer: COMMERCIAL

## 2018-09-20 VITALS
WEIGHT: 199 LBS | HEART RATE: 86 BPM | DIASTOLIC BLOOD PRESSURE: 60 MMHG | BODY MASS INDEX: 36.62 KG/M2 | SYSTOLIC BLOOD PRESSURE: 114 MMHG

## 2018-09-20 DIAGNOSIS — O09.813 PREGNANCY RESULTING FROM IN VITRO FERTILIZATION IN THIRD TRIMESTER: Primary | ICD-10-CM

## 2018-09-20 DIAGNOSIS — O99.013 ANEMIA AFFECTING PREGNANCY IN THIRD TRIMESTER: ICD-10-CM

## 2018-09-20 LAB — HGB BLD-MCNC: 10.8 G/DL (ref 11.7–15.7)

## 2018-09-20 PROCEDURE — 99207 ZZC COMPLICATED OB VISIT: CPT | Performed by: OBSTETRICS & GYNECOLOGY

## 2018-09-20 PROCEDURE — 36415 COLL VENOUS BLD VENIPUNCTURE: CPT | Performed by: OBSTETRICS & GYNECOLOGY

## 2018-09-20 PROCEDURE — 85018 HEMOGLOBIN: CPT | Performed by: OBSTETRICS & GYNECOLOGY

## 2018-09-20 PROCEDURE — 87653 STREP B DNA AMP PROBE: CPT | Performed by: OBSTETRICS & GYNECOLOGY

## 2018-09-20 ASSESSMENT — PATIENT HEALTH QUESTIONNAIRE - PHQ9
10. IF YOU CHECKED OFF ANY PROBLEMS, HOW DIFFICULT HAVE THESE PROBLEMS MADE IT FOR YOU TO DO YOUR WORK, TAKE CARE OF THINGS AT HOME, OR GET ALONG WITH OTHER PEOPLE: NOT DIFFICULT AT ALL
SUM OF ALL RESPONSES TO PHQ QUESTIONS 1-9: 5
SUM OF ALL RESPONSES TO PHQ QUESTIONS 1-9: 5

## 2018-09-20 NOTE — PROGRESS NOTES
Presents for routine  appointment.     No complaints.    No LOF/VB/Ctxs.    ROS:   and GI  negative.     Please see Prenatal Vitals and Notes Flowsheet for objective data.    A/P:  28 year old  at 35w6d       ICD-10-CM    1. Pregnancy resulting from in vitro fertilization in third trimester O09.813 Strep, Group B by PCR   2. Anemia affecting pregnancy in third trimester O99.013 Hemoglobin       Group B Strep collected today  Discussed labor and what to expect. Discussed when to go to the birth center.  Follow up in 1 week.      Anna Norris MD          Answers for HPI/ROS submitted by the patient on 2018   If you checked off any problems, how difficult have these problems made it for you to do your work, take care of things at home, or get along with other people?: Not difficult at all  PHQ9 TOTAL SCORE: 5

## 2018-09-20 NOTE — NURSING NOTE
"Chief Complaint   Patient presents with     Prenatal Care       Initial /60 (BP Location: Right arm, Patient Position: Chair, Cuff Size: Adult Regular)  Pulse 86  Wt 199 lb (90.3 kg)  LMP 10/24/2017 (Approximate)  BMI 36.62 kg/m2 Estimated body mass index is 36.6 kg/(m^2) as calculated from the following:    Height as of 18: 5' 1.81\" (1.57 m).    Weight as of 18: 198 lb 14.4 oz (90.2 kg).  BP completed using cuff size: regular        PHQ-9 score:    PHQ-9 SCORE 2018   Total Score MyChart 5 (Mild depression)   Total Score 5       Penelope Garza, [facility]  2018          "

## 2018-09-20 NOTE — MR AVS SNAPSHOT
After Visit Summary   9/20/2018    Jacqueline Bolden    MRN: [id number]           Patient Information     Date Of Birth          1990        Visit Information        Provider Department      9/20/2018 8:15 AM Anna Norris MD [facility]        Today's Diagnoses     Pregnancy resulting from in vitro fertilization in third trimester    -  1    Anemia affecting pregnancy in third trimester           Follow-ups after your visit        Follow-up notes from your care team     Return in about 1 week (around 9/27/2018) for OB Visit.      Your next 10 appointments already scheduled     Sep 25, 2018  9:00 AM CDT   ESTABLISHED PRENATAL with PERCY Herrera CNM   [facility]    [address] [facility] [address]   [phone]            Oct 02, 2018  9:00 AM CDT   ESTABLISHED PRENATAL with PERCY Herrera CNM   [facility]    [address] [facility] [address] [phone]            Oct 09, 2018 10:00 AM CDT   ESTABLISHED PRENATAL with PERCY Herrera CNM   [facility]    [address] [facility] [address] [phone]              Who to contact     If you have questions or need follow up information about today's clinic visit or your schedule please contact [facility] directly at [phone].  Normal or non-critical lab and imaging results will be communicated to you by MyChart, letter or phone within 4 business days after the clinic has received the results. If you do not hear from us within 7 days, please contact the clinic through Stylrhart or phone. If you have a critical or abnormal lab result, we will notify you by phone as soon as possible.  Submit refill requests through [employer] or call your pharmacy and they will forward the refill request to us. Please allow 3 business days for your  refill to be completed.          Additional Information About Your Visit        MyChart Information     [email or web address] gives you secure access to your electronic health record. If you see a primary care provider, you can also send messages to your care team and make appointments. If you have questions, please call your primary care clinic.  If you do not have a primary care provider, please call [phone] and they will assist you.        Care EveryWhere ID     This is your Care EveryWhere ID. This could be used by other organizations to access your [place] medical records  [id number]        Your Vitals Were     Pulse Last Period BMI (Body Mass Index)             86 10/24/2017 (Approximate) 36.62 kg/m2          Blood Pressure from Last 3 Encounters:   09/20/18 114/60   09/14/18 119/72   09/12/18 112/71    Weight from Last 3 Encounters:   09/20/18 199 lb (90.3 kg)   09/14/18 198 lb 14.4 oz (90.2 kg)   08/27/18 192 lb (87.1 kg)              We Performed the Following     Hemoglobin     Strep, Group B by PCR        Primary Care Provider Office Phone # Fax #    Jhoana Wen PA-C [phone]       [address] [place] [address]        Equal Access to Services     ARYA ZAMUDIO : Hadii elias ku hadasho Soomaali, waaxda luqadaha, qaybta kaalmada adeegyada, beatrice hobson. So [facility] [phone].    ATENCIÓN: Si habla español, tiene a souza disposición servicios gratuitos de asistencia lingüística. Llame al [phone].    We comply with applicable federal civil rights laws and [place] laws. We do not discriminate on the basis of race, color, national origin, age, disability, sex, sexual orientation, or gender identity.            Thank you!     Thank you for choosing [facility]  for your care. Our goal is always to provide you with excellent care. Hearing back from our patients is one way we can continue to improve our services. Please take a few minutes to  complete the written survey that you may receive in the mail after your visit with us. Thank you!             Your Updated Medication List - Protect others around you: Learn how to safely use, store and throw away your medicines at www.disposemymeds.org.          This list is accurate as of 9/20/18  9:01 AM.  Always use your most recent med list.                   Brand Name Dispense Instructions for use Diagnosis    breast pump Misc     1 each    1 each continuous prn    Lactating mother       PRENATAL VITAMINS PO           VITAMIN D (CHOLECALCIFEROL) PO      Take by mouth daily

## 2018-09-21 LAB
GP B STREP DNA SPEC QL NAA+PROBE: NEGATIVE
SPECIMEN SOURCE: NORMAL

## 2018-09-21 ASSESSMENT — PATIENT HEALTH QUESTIONNAIRE - PHQ9: SUM OF ALL RESPONSES TO PHQ QUESTIONS 1-9: 5

## 2018-09-25 ENCOUNTER — PRENATAL OFFICE VISIT (OUTPATIENT)
Dept: OBGYN | Facility: OTHER | Age: 28
End: 2018-09-25
Payer: COMMERCIAL

## 2018-09-25 VITALS
SYSTOLIC BLOOD PRESSURE: 112 MMHG | DIASTOLIC BLOOD PRESSURE: 72 MMHG | BODY MASS INDEX: 37.08 KG/M2 | WEIGHT: 201.5 LBS | HEART RATE: 88 BPM

## 2018-09-25 DIAGNOSIS — O09.813 PREGNANCY RESULTING FROM IN VITRO FERTILIZATION IN THIRD TRIMESTER: Primary | ICD-10-CM

## 2018-09-25 DIAGNOSIS — O99.013 ANEMIA AFFECTING PREGNANCY IN THIRD TRIMESTER: ICD-10-CM

## 2018-09-25 LAB — FERRITIN SERPL-MCNC: 120 NG/ML (ref 12–150)

## 2018-09-25 PROCEDURE — 99207 ZZC PRENATAL VISIT: CPT | Performed by: ADVANCED PRACTICE MIDWIFE

## 2018-09-25 PROCEDURE — 82728 ASSAY OF FERRITIN: CPT | Performed by: ADVANCED PRACTICE MIDWIFE

## 2018-09-25 PROCEDURE — 99000 SPECIMEN HANDLING OFFICE-LAB: CPT | Performed by: ADVANCED PRACTICE MIDWIFE

## 2018-09-25 PROCEDURE — 36415 COLL VENOUS BLD VENIPUNCTURE: CPT | Performed by: ADVANCED PRACTICE MIDWIFE

## 2018-09-25 PROCEDURE — 83021 HEMOGLOBIN CHROMOTOGRAPHY: CPT | Mod: 90 | Performed by: ADVANCED PRACTICE MIDWIFE

## 2018-09-25 PROCEDURE — 83020 HEMOGLOBIN ELECTROPHORESIS: CPT | Mod: 90 | Performed by: ADVANCED PRACTICE MIDWIFE

## 2018-09-25 NOTE — MR AVS SNAPSHOT
After Visit Summary   9/25/2018    Jacqueline Bolden    MRN: [id number]           Patient Information     Date Of Birth          1990        Visit Information        Provider Department      9/25/2018 9:00 AM Ludy Jameson APRN CNM [facility]        Today's Diagnoses     Pregnancy resulting from in vitro fertilization in third trimester    -  1    Anemia affecting pregnancy in third trimester          Care Instructions    Ferrous fumerate or slow fe            Follow-ups after your visit        Your next 10 appointments already scheduled     Oct 02, 2018  9:00 AM CDT   ESTABLISHED PRENATAL with PERCY Herrera CNM   [facility]    [address] [facility] [address] [phone]            Oct 09, 2018 10:00 AM CDT   ESTABLISHED PRENATAL with PERCY Herrera CNM   [facility]    [address] [facility] [address] [phone]              Who to contact     If you have questions or need follow up information about today's clinic visit or your schedule please contact [facility] directly at [phone].  Normal or non-critical lab and imaging results will be communicated to you by MyChart, letter or phone within 4 business days after the clinic has received the results. If you do not hear from us within 7 days, please contact the clinic through Commonplace Digital[employer] or phone. If you have a critical or abnormal lab result, we will notify you by phone as soon as possible.  Submit refill requests through [employer] or call your pharmacy and they will forward the refill request to us. Please allow 3 business days for your refill to be completed.          Additional Information About Your Visit        MyChart Information     Clear[employer] gives you secure access to your electronic health record. If you see a primary care provider, you can also send messages to your care team  and make appointments. If you have questions, please call your primary care clinic.  If you do not have a primary care provider, please call [phone] and they will assist you.        Care EveryWhere ID     This is your Care EveryWhere ID. This could be used by other organizations to access your [place] medical records  [id number]        Your Vitals Were     Pulse Last Period BMI (Body Mass Index)             88 10/24/2017 (Approximate) 37.08 kg/m2          Blood Pressure from Last 3 Encounters:   09/25/18 112/72   09/20/18 114/60   09/14/18 119/72    Weight from Last 3 Encounters:   09/25/18 201 lb 8 oz (91.4 kg)   09/20/18 199 lb (90.3 kg)   09/14/18 198 lb 14.4 oz (90.2 kg)              We Performed the Following     Ferritin     HGB Eval Reflex to ELP or RBC Solubility        Primary Care Provider Office Phone # Fax #    Jhoana Wen PA-C [phone]       [address] [place] [address]        Equal Access to Services     [facility]: Hadii aad ku hadasho Soomaali, waaxda luqadaha, qaybta kaalmada adeegyavinny, waxsandra bullock . So [facility] [phone].    ATENCIÓN: Si habla español, tiene a souza disposición servicios gratuitos de asistencia lingüística. [facility] [phone].    We comply with applicable federal civil rights laws and [place] laws. We do not discriminate on the basis of race, color, national origin, age, disability, sex, sexual orientation, or gender identity.            Thank you!     Thank you for choosing [facility]  for your care. Our goal is always to provide you with excellent care. Hearing back from our patients is one way we can continue to improve our services. Please take a few minutes to complete the written survey that you may receive in the mail after your visit with us. Thank you!             Your Updated Medication List - Protect others around you: Learn how to safely use, store and throw away your medicines at  www.disposemymeds.org.          This list is accurate as of 9/25/18  9:25 AM.  Always use your most recent med list.                   Brand Name Dispense Instructions for use Diagnosis    breast pump Misc     1 each    1 each continuous prn    Lactating mother       PRENATAL VITAMINS PO           VITAMIN D (CHOLECALCIFEROL) PO      Take by mouth daily

## 2018-09-25 NOTE — NURSING NOTE
"Chief Complaint   Patient presents with     Prenatal Care       Initial /72 (BP Location: Left arm, Patient Position: Chair, Cuff Size: Adult Regular)  Pulse 88  Wt 201 lb 8 oz (91.4 kg)  LMP 10/24/2017 (Approximate)  BMI 37.08 kg/m2 Estimated body mass index is 37.08 kg/(m^2) as calculated from the following:    Height as of 1/9/18: 5' 1.81\" (1.57 m).    Weight as of this encounter: 201 lb 8 oz (91.4 kg).  Medication Reconciliation: complete    Amaris Garcia CMA    "

## 2018-09-25 NOTE — PROGRESS NOTES
Tired of being pregnant.   Having nerve type pain in her upper abd.  Can't stand anything touching it.   No contr/LOF/VB  Ultrasound confirms vertex.   Reviewed labor instructions.   Reviewed labs.  Reports that her MGM is  and several people on that side of her family have trouble with anemia,   Will check labs and ferritin level.  Is not taking fe.   RTC 1 w   Ludy Moyer, APRN, CNM

## 2018-09-28 LAB
HGB A1 MFR BLD: 96.9 % (ref 95–97.9)
HGB A2 MFR BLD: 2.5 % (ref 2–3.5)
HGB C MFR BLD: 0 % (ref 0–0)
HGB E MFR BLD: 0 % (ref 0–0)
HGB F MFR BLD: 0.6 % (ref 0–2.1)
HGB FRACT BLD ELPH-IMP: ABNORMAL
HGB OTHER MFR BLD: ABNORMAL % (ref 0–0)
HGB S BLD QL SOLY: ABNORMAL
HGB S MFR BLD: 0 % (ref 0–0)
PATH INTERP BLD-IMP: ABNORMAL

## 2018-10-02 ENCOUNTER — PRENATAL OFFICE VISIT (OUTPATIENT)
Dept: OBGYN | Facility: OTHER | Age: 28
End: 2018-10-02
Payer: COMMERCIAL

## 2018-10-02 VITALS
WEIGHT: 203.25 LBS | HEART RATE: 88 BPM | SYSTOLIC BLOOD PRESSURE: 118 MMHG | DIASTOLIC BLOOD PRESSURE: 74 MMHG | BODY MASS INDEX: 37.4 KG/M2

## 2018-10-02 DIAGNOSIS — D56.0 HEMOGLOBIN H CONSTANT SPRING VARIANT (H): ICD-10-CM

## 2018-10-02 DIAGNOSIS — O09.813 PREGNANCY RESULTING FROM IN VITRO FERTILIZATION IN THIRD TRIMESTER: Primary | ICD-10-CM

## 2018-10-02 PROCEDURE — 99207 ZZC PRENATAL VISIT: CPT | Performed by: ADVANCED PRACTICE MIDWIFE

## 2018-10-02 NOTE — MR AVS SNAPSHOT
After Visit Summary   10/2/2018    Jacqueline Bolden    MRN: [id number]           Patient Information     Date Of Birth          1990        Visit Information        Provider Department      10/2/2018 9:00 AM Ludy Jameson APRN CNM [facility]        Today's Diagnoses     Pregnancy resulting from in vitro fertilization in third trimester    -  1    Hemoglobin H constant spring variant (H)           Follow-ups after your visit        Additional Services     ONC/HEME ADULT REFERRAL       Your provider has referred you to: [facility]: Cancer Care/Hematology (All Cancer Related Services) - [place] [phone]   [email or web address]    Please be aware that coverage of these services is subject to the terms and limitations of your health insurance plan.  Call member services at your health plan with any benefit or coverage questions.      Please bring the following with you to your appointment:    (1) Any X-Rays, CTs or MRIs which have been performed.  Contact the facility where they were done to arrange for  prior to your scheduled appointment.   (2) List of current medications  (3) This referral request   (4) Any documents/labs given to you for this referral                  Your next 10 appointments already scheduled     Oct 09, 2018 10:00 AM CDT   ESTABLISHED PRENATAL with PERCY Herrera CNM   [facility] ([facility])    [address] [facility] [address] [phone]              Who to contact     If you have questions or need follow up information about today's clinic visit or your schedule please contact [facility] directly at [phone].  Normal or non-critical lab and imaging results will be communicated to you by MyChart, letter or phone within 4 business days after the clinic has received the results. If you do not hear from us within 7 days, please  contact the clinic through Sed[employer] or phone. If you have a critical or abnormal lab result, we will notify you by phone as soon as possible.  Submit refill requests through [employer] or call your pharmacy and they will forward the refill request to us. Please allow 3 business days for your refill to be completed.          Additional Information About Your Visit        M_SOLUTIONharmySBX Information     Sedicii gives you secure access to your electronic health record. If you see a primary care provider, you can also send messages to your care team and make appointments. If you have questions, please call your primary care clinic.  If you do not have a primary care provider, please call [phone] and they will assist you.        Care EveryWhere ID     This is your Care EveryWhere ID. This could be used by other organizations to access your [place] medical records  [id number]        Your Vitals Were     Pulse Last Period BMI (Body Mass Index)             88 10/24/2017 (Approximate) 37.4 kg/m2          Blood Pressure from Last 3 Encounters:   10/02/18 118/74   09/25/18 112/72   09/20/18 114/60    Weight from Last 3 Encounters:   10/02/18 203 lb 4 oz (92.2 kg)   09/25/18 201 lb 8 oz (91.4 kg)   09/20/18 199 lb (90.3 kg)              We Performed the Following     ONC/HEME ADULT REFERRAL        Primary Care Provider Fax #    Physician No Ref-Primary [phone]       No address on file        Equal Access to Services     ARYA ZAMUDIO : Hadii elias Castellano, waaxda luqadaha, qaybta kaaltona love, beatrice bullock . So [facility] [phone].    ATENCIÓN: Si habla español, tiene a souza disposición servicios gratuitos de asistencia lingüística. Layla al [phone].    We comply with applicable federal civil rights laws and [place] laws. We do not discriminate on the basis of race, color, national origin, age, disability, sex, sexual orientation, or gender identity.            Thank you!     Thank  you for choosing [facility]  for your care. Our goal is always to provide you with excellent care. Hearing back from our patients is one way we can continue to improve our services. Please take a few minutes to complete the written survey that you may receive in the mail after your visit with us. Thank you!             Your Updated Medication List - Protect others around you: Learn how to safely use, store and throw away your medicines at www.disposemymeds.org.          This list is accurate as of 10/2/18  9:28 AM.  Always use your most recent med list.                   Brand Name Dispense Instructions for use Diagnosis    breast pump Misc     1 each    1 each continuous prn    Lactating mother       PRENATAL VITAMINS PO           VITAMIN D (CHOLECALCIFEROL) PO      Take by mouth daily

## 2018-10-02 NOTE — PROGRESS NOTES
Very much wants to deliver, no contractions.   Continues to have superficial pain in her upper abd.   Also has right hand carpel tunnel.  Job involves keyboarding.  Discussed relief measures.  Reviewed labs.  Will refer to hematology for constant spring evaluation.   RTC 1 w  Ludy Moyer, PERCY, CNM

## 2018-10-02 NOTE — NURSING NOTE
"Chief Complaint   Patient presents with     Prenatal Care     right hand tingling       Initial /74 (BP Location: Right arm, Patient Position: Sitting, Cuff Size: Adult Large)  Pulse 88  Wt 203 lb 4 oz (92.2 kg)  LMP 10/24/2017 (Approximate)  BMI 37.4 kg/m2 Estimated body mass index is 37.4 kg/(m^2) as calculated from the following:    Height as of 1/9/18: 5' 1.81\" (1.57 m).    Weight as of this encounter: 203 lb 4 oz (92.2 kg).  Medication Reconciliation: complete    Amaris Garcia CMA    "

## 2018-10-09 ENCOUNTER — PRENATAL OFFICE VISIT (OUTPATIENT)
Dept: OBGYN | Facility: OTHER | Age: 28
End: 2018-10-09
Payer: COMMERCIAL

## 2018-10-09 VITALS
BODY MASS INDEX: 37.63 KG/M2 | WEIGHT: 204.5 LBS | HEART RATE: 72 BPM | SYSTOLIC BLOOD PRESSURE: 112 MMHG | DIASTOLIC BLOOD PRESSURE: 72 MMHG

## 2018-10-09 DIAGNOSIS — O09.813 PREGNANCY RESULTING FROM IN VITRO FERTILIZATION IN THIRD TRIMESTER: Primary | ICD-10-CM

## 2018-10-09 DIAGNOSIS — O36.8120 DECREASED FETAL MOVEMENTS IN SECOND TRIMESTER, SINGLE OR UNSPECIFIED FETUS: ICD-10-CM

## 2018-10-09 PROCEDURE — 99207 ZZC PRENATAL VISIT: CPT | Performed by: ADVANCED PRACTICE MIDWIFE

## 2018-10-09 PROCEDURE — 59025 FETAL NON-STRESS TEST: CPT | Performed by: ADVANCED PRACTICE MIDWIFE

## 2018-10-09 NOTE — MR AVS SNAPSHOT
After Visit Summary   10/9/2018    Jacqueline Bolden    MRN: [id number]           Patient Information     Date Of Birth          1990        Visit Information        Provider Department      10/9/2018 10:00 AM Ludy Jameson APRN CNM [facility]        Today's Diagnoses     Pregnancy resulting from in vitro fertilization in third trimester    -  1    Decreased fetal movements in second trimester, single or unspecified fetus           Follow-ups after your visit        Follow-up notes from your care team     Return in about 1 week (around 10/16/2018).      Your next 10 appointments already scheduled     Oct 18, 2018 11:45 AM CDT   ESTABLISHED PRENATAL with PERCY Herrera CNM   [facility]    [address] [facility] [address] [phone]              Who to contact     If you have questions or need follow up information about today's clinic visit or your schedule please contact [facility] [place] [facility] directly at [phone].  Normal or non-critical lab and imaging results will be communicated to you by FOBOhart, letter or phone within 4 business days after the clinic has received the results. If you do not hear from us within 7 days, please contact the clinic through Trunityt or phone. If you have a critical or abnormal lab result, we will notify you by phone as soon as possible.  Submit refill requests through [employer] or call your pharmacy and they will forward the refill request to us. Please allow 3 business days for your refill to be completed.          Additional Information About Your Visit        FOBOhart Information     [employer] gives you secure access to your electronic health record. If you see a primary care provider, you can also send messages to your care team and make appointments. If you have questions, please call your primary care clinic.  If you do not have a primary care provider, please call  [phone] and they will assist you.        Care EveryWhere ID     This is your Care EveryWhere ID. This could be used by other organizations to access your [place] medical records  [id number]        Your Vitals Were     Pulse Last Period BMI (Body Mass Index)             72 10/24/2017 (Approximate) 37.63 kg/m2          Blood Pressure from Last 3 Encounters:   10/09/18 112/72   10/02/18 118/74   09/25/18 112/72    Weight from Last 3 Encounters:   10/09/18 204 lb 8 oz (92.8 kg)   10/02/18 203 lb 4 oz (92.2 kg)   09/25/18 201 lb 8 oz (91.4 kg)              We Performed the Following     FETAL NON-STRESS TEST        Primary Care Provider Fax #    Physician No Ref-Primary [phone]       No address on file        Equal Access to Services     ARYA ZAMUDIO : Ernesto Castellano, olivia martin, anastacio love, beatrice bullock . So [facility] [phone].    ATENCIÓN: Si habla español, tiene a souza disposición servicios gratuitos de asistencia lingüística. Layla al [phone].    We comply with applicable federal civil rights laws and [place] laws. We do not discriminate on the basis of race, color, national origin, age, disability, sex, sexual orientation, or gender identity.            Thank you!     Thank you for choosing [facility]  for your care. Our goal is always to provide you with excellent care. Hearing back from our patients is one way we can continue to improve our services. Please take a few minutes to complete the written survey that you may receive in the mail after your visit with us. Thank you!             Your Updated Medication List - Protect others around you: Learn how to safely use, store and throw away your medicines at www.disposemymeds.org.          This list is accurate as of 10/9/18 11:17 AM.  Always use your most recent med list.                   Brand Name Dispense Instructions for use Diagnosis    breast pump Misc     1 each     1 each continuous prn    Lactating mother       PRENATAL VITAMINS PO           VITAMIN D (CHOLECALCIFEROL) PO      Take by mouth daily

## 2018-10-09 NOTE — PROGRESS NOTES
Feeling well.   Discussed induction and cervical ripening.   Reviewed that with no medical reason induction can't occur before 41 weeks if ripening is required.   NST reactive.  Accels no decels. Contra 1-3 not noted by pt.  Some are irritability.   Baseline 145.   Run time 21 min  RTC 1 w.  Ludy Moyer, PERCY, CLARITZAM

## 2018-10-09 NOTE — NURSING NOTE
"Chief Complaint   Patient presents with     Prenatal Care       Initial /72 (BP Location: Right arm, Patient Position: Sitting, Cuff Size: Adult Large)  Pulse 72  Wt 204 lb 8 oz (92.8 kg)  LMP 10/24/2017 (Approximate)  BMI 37.63 kg/m2 Estimated body mass index is 37.63 kg/(m^2) as calculated from the following:    Height as of 1/9/18: 5' 1.81\" (1.57 m).    Weight as of this encounter: 204 lb 8 oz (92.8 kg).  Medication Reconciliation: complete    Amaris Garcia CMA    "

## 2018-10-16 ENCOUNTER — PRENATAL OFFICE VISIT (OUTPATIENT)
Dept: OBGYN | Facility: OTHER | Age: 28
End: 2018-10-16
Payer: COMMERCIAL

## 2018-10-16 ENCOUNTER — MYC MEDICAL ADVICE (OUTPATIENT)
Dept: OBGYN | Facility: OTHER | Age: 28
End: 2018-10-16

## 2018-10-16 VITALS
SYSTOLIC BLOOD PRESSURE: 122 MMHG | WEIGHT: 206.25 LBS | HEART RATE: 84 BPM | BODY MASS INDEX: 37.95 KG/M2 | DIASTOLIC BLOOD PRESSURE: 74 MMHG

## 2018-10-16 DIAGNOSIS — O09.813 PREGNANCY RESULTING FROM IN VITRO FERTILIZATION IN THIRD TRIMESTER: Primary | ICD-10-CM

## 2018-10-16 PROCEDURE — 99207 ZZC PRENATAL VISIT: CPT | Performed by: ADVANCED PRACTICE MIDWIFE

## 2018-10-16 NOTE — NURSING NOTE
"Chief Complaint   Patient presents with     Prenatal Care       Initial /74 (BP Location: Right arm, Patient Position: Sitting, Cuff Size: Adult Regular)  Pulse 84  Wt 206 lb 4 oz (93.6 kg)  LMP 10/24/2017 (Approximate)  BMI 37.95 kg/m2 Estimated body mass index is 37.95 kg/(m^2) as calculated from the following:    Height as of 1/9/18: 5' 1.81\" (1.57 m).    Weight as of this encounter: 206 lb 4 oz (93.6 kg).  Medication Reconciliation: complete    Amaris Garcia CMA    "

## 2018-10-16 NOTE — MR AVS SNAPSHOT
After Visit Summary   10/16/2018    Jacqueline Bolden    MRN: [id number]           Patient Information     Date Of Birth          1990        Visit Information        Provider Department      10/16/2018 9:45 AM Ludy Jameson APRN CNM [facility]        Today's Diagnoses     Pregnancy resulting from in vitro fertilization in third trimester    -  1       Follow-ups after your visit        Follow-up notes from your care team     Return in about 1 week (around 10/23/2018).      Your next 10 appointments already scheduled     Oct 23, 2018  9:45 AM CDT   ESTABLISHED PRENATAL with PERCY Herrera CNM   [facility]    [address] [facility] [address] [phone]              Who to contact     If you have questions or need follow up information about today's clinic visit or your schedule please contact [facility] directly at [phone].  Normal or non-critical lab and imaging results will be communicated to you by [employer], letter or phone within 4 business days after the clinic has received the results. If you do not hear from us within 7 days, please contact the clinic through [employer] or phone. If you have a critical or abnormal lab result, we will notify you by phone as soon as possible.  Submit refill requests through [employer] or call your pharmacy and they will forward the refill request to us. Please allow 3 business days for your refill to be completed.          Additional Information About Your Visit        MyChart Information     [employer] gives you secure access to your electronic health record. If you see a primary care provider, you can also send messages to your care team and make appointments. If you have questions, please call your primary care clinic.  If you do not have a primary care provider, please call [phone] and they will assist you.        Care EveryWhere ID     This is your Care  EveryWhere ID. This could be used by other organizations to access your [facility] medical records  [id number]        Your Vitals Were     Pulse Last Period BMI (Body Mass Index)             84 10/24/2017 (Approximate) 37.95 kg/m2          Blood Pressure from Last 3 Encounters:   10/16/18 122/74   10/09/18 112/72   10/02/18 118/74    Weight from Last 3 Encounters:   10/16/18 206 lb 4 oz (93.6 kg)   10/09/18 204 lb 8 oz (92.8 kg)   10/02/18 203 lb 4 oz (92.2 kg)              Today, you had the following     No orders found for display       Primary Care Provider Fax #    Physician No Ref-Primary [phone]       No address on file        Equal Access to Services     ARYA ZAMUDIO : Ernesto Castellano, waabhijeetda luqadaha, qaybta kaalmada ivan, beatrice bullock . So [facility] [phone].    ATENCIÓN: Si habla español, tiene a souza disposición servicios gratuitos de asistencia lingüística. Llame al [phone].    We comply with applicable federal civil rights laws and [place] laws. We do not discriminate on the basis of race, color, national origin, age, disability, sex, sexual orientation, or gender identity.            Thank you!     Thank you for choosing [facility]  for your care. Our goal is always to provide you with excellent care. Hearing back from our patients is one way we can continue to improve our services. Please take a few minutes to complete the written survey that you may receive in the mail after your visit with us. Thank you!             Your Updated Medication List - Protect others around you: Learn how to safely use, store and throw away your medicines at www.disposemymeds.org.          This list is accurate as of 10/16/18  1:12 PM.  Always use your most recent med list.                   Brand Name Dispense Instructions for use Diagnosis    breast pump Misc     1 each    1 each continuous prn    Lactating mother       PRENATAL VITAMINS PO            VITAMIN D (CHOLECALCIFEROL) PO      Take by mouth daily

## 2018-10-16 NOTE — PROGRESS NOTES
Feeling well but really tired of being pregnant and anxious to deliver.   Cx remains unfavorable with PP high.   Will plan ripening with induction in about 9 days.   Will schedule today per her request.   Scheduled for 10/25 to call at 6 PM  Denied contra/lof/vb  Baby is active  RTC1 w  Ludy Moyer, APRN, CNM

## 2018-10-18 ENCOUNTER — MYC MEDICAL ADVICE (OUTPATIENT)
Dept: OBGYN | Facility: OTHER | Age: 28
End: 2018-10-18

## 2018-10-19 ENCOUNTER — MYC MEDICAL ADVICE (OUTPATIENT)
Dept: ONCOLOGY | Facility: CLINIC | Age: 28
End: 2018-10-19

## 2018-10-19 ENCOUNTER — ONCOLOGY VISIT (OUTPATIENT)
Dept: ONCOLOGY | Facility: CLINIC | Age: 28
End: 2018-10-19
Payer: COMMERCIAL

## 2018-10-19 VITALS
SYSTOLIC BLOOD PRESSURE: 118 MMHG | TEMPERATURE: 98.2 F | RESPIRATION RATE: 16 BRPM | HEIGHT: 62 IN | DIASTOLIC BLOOD PRESSURE: 77 MMHG | WEIGHT: 208.38 LBS | BODY MASS INDEX: 38.35 KG/M2 | OXYGEN SATURATION: 96 % | HEART RATE: 89 BPM

## 2018-10-19 DIAGNOSIS — D56.3: Primary | ICD-10-CM

## 2018-10-19 PROBLEM — D58.2: Status: ACTIVE | Noted: 2018-10-19

## 2018-10-19 LAB
ERYTHROCYTE [DISTWIDTH] IN BLOOD BY AUTOMATED COUNT: 16.8 % (ref 10–15)
HBA1 GENE MUT ANL BLD/T: NORMAL
HCT VFR BLD AUTO: 37.4 % (ref 35–47)
HGB BLD-MCNC: 11.7 G/DL (ref 11.7–15.7)
MCH RBC QN AUTO: 25.6 PG (ref 26.5–33)
MCHC RBC AUTO-ENTMCNC: 31.3 G/DL (ref 31.5–36.5)
MCV RBC AUTO: 82 FL (ref 78–100)
PLATELET # BLD AUTO: 238 10E9/L (ref 150–450)
RBC # BLD AUTO: 4.57 10E12/L (ref 3.8–5.2)
SPECIMEN SOURCE: NORMAL
WBC # BLD AUTO: 9.4 10E9/L (ref 4–11)

## 2018-10-19 PROCEDURE — 99214 OFFICE O/P EST MOD 30 MIN: CPT | Performed by: INTERNAL MEDICINE

## 2018-10-19 PROCEDURE — 81259 HBA1/HBA2 FULL GENE SEQUENCE: CPT | Mod: 90 | Performed by: INTERNAL MEDICINE

## 2018-10-19 PROCEDURE — 36415 COLL VENOUS BLD VENIPUNCTURE: CPT | Performed by: INTERNAL MEDICINE

## 2018-10-19 PROCEDURE — 99000 SPECIMEN HANDLING OFFICE-LAB: CPT | Performed by: INTERNAL MEDICINE

## 2018-10-19 PROCEDURE — 85027 COMPLETE CBC AUTOMATED: CPT | Performed by: INTERNAL MEDICINE

## 2018-10-19 ASSESSMENT — PAIN SCALES - GENERAL: PAINLEVEL: NO PAIN (0)

## 2018-10-19 NOTE — NURSING NOTE
"Oncology Rooming Note    October 19, 2018 12:00 PM   Jacqueline Bolden is a 28 year old female who presents for:    Chief Complaint   Patient presents with     Oncology Clinic Visit     new patient      Initial Vitals: /77  Pulse 89  Temp 98.2  F (36.8  C) (Oral)  Resp 16  Ht 1.57 m (5' 1.81\")  Wt 94.5 kg (208 lb 6 oz)  LMP 10/24/2017 (Approximate)  SpO2 96%  BMI 38.35 kg/m2 Estimated body mass index is 38.35 kg/(m^2) as calculated from the following:    Height as of this encounter: 1.57 m (5' 1.81\").    Weight as of this encounter: 94.5 kg (208 lb 6 oz). Body surface area is 2.03 meters squared.  No Pain (0) Comment: Data Unavailable   Patient's last menstrual period was 10/24/2017 (approximate).  Allergies reviewed: Yes  Medications reviewed: Yes    Medications: Medication refills not needed today.  Pharmacy name entered into EPIC:    [id number] - [place], [place] - [address]  [id number] - [place], [place] - [address]  [facility]       5 minutes for nursing intake (face to face time)     Elaine Fernandes LPN              "

## 2018-10-19 NOTE — LETTER
10/19/2018         RE: Jacqueline Bolden  [address] [facility] [address]        Dear Colleague,    Thank you for referring your patient, Jacqueline Bolden, to the [facility]. Please see a copy of my visit note below.    Hematology initial visit:  Date on this visit: 10/19/2018    Jacqueline Bolden  is referred by Dr.Janis CHAY Moyer for a hematology consultation. 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  She tells me that pregnancy is otherwise going well and she is 40 weeks pregnant so she has the usual tiredness from pregnancy and some mild leg swelling.  Throughout her life she has had heavy periods although over the last few years they have not been as heavy.  Previously she was told that she has iron deficiency anemia and the first time she was told about it was when she was 16 years old.  Off-and-on she has taken oral iron.  During this pregnancy she was found to have iron deficiency and was given Venofer a couple of months back.  Prior to pregnancy her hemoglobin was normal with low normal MCV.  Otherwise she continues to feel well and denies any other complaints  like significant abnormal bleeding, infections, trouble breathing.  Denies GI problems apart from occasional loose stools.  As mentioned above otherwise pregnancy is going uncomplicated.    She tells me that her maternal grandmother is from [facility] and maternal aunts have some kind of anemia but she is not sure of the details.  1 of her brothers probably also has anemia but she is not sure of the details.  She tells me that  they have been told that they need iron but they usually do not take it.      ROS:  A comprehensive ROS was otherwise neg      Past Medical/Surgical History:  Past Medical History:   Diagnosis Date     Dysmenorrhea      Irregular menstrual cycle      Surveillance of previously prescribed intrauterine contraceptive device 12/2008    paraguard     Past Surgical History:   Procedure Laterality Date     NO HISTORY OF SURGERY       Allergies:  Allergies as of 10/19/2018 - [place] as Reviewed 10/19/2018   Allergen Reaction Noted     No known drug allergies  08/13/2001     Current Medications:  Current Outpatient Prescriptions   Medication Sig Dispense Refill     Misc. Devices (BREAST PUMP) MISC 1 each continuous prn 1 each 0     Prenatal Multivit-Min-Fe-FA (PRENATAL VITAMINS PO)        VITAMIN D, CHOLECALCIFEROL, PO Take by mouth daily        Family History:  Family History   Problem Relation Age of Onset     Family History Negative No family hx of    Her maternal grandmother is from [facility].  She herself was born and raised in the [place].  No family history of bleeding or clotting disorder.  As mentioned above maternal aunts have some kind of anemia but she is not sure of the details.  1 of her brothers probably also has anemia but she is not sure of the details.  She tells me that they have been told that they need iron but they usually do not take it.    Social History:  Social History     Social History     Marital status:      Spouse name: N/A     Number of children: N/A     Years of education: N/A     Occupational History     student 0student     [employer] school Student     Social History Main Topics     Smoking status: Former Smoker     Packs/day: 1.00     Types: Cigarettes     Smokeless tobacco: Never Used      Comment:  1 pack every 5 days     Alcohol use Yes      Comment: OCC.     Drug use: No     Sexual activity: Yes     Partners: Male     Birth control/  "protection: None     Other Topics Concern     Not on file     Social History Narrative    Lives with housemates. No domestic violence issues.   She used to smoke but quit in 2012.  Drinks alcohol very occasionally.  She lives with her .  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   ENT/MOUTH: no mouth lesions. Ears normal  CVS: s1s2 no m r g .   RESPIRATORY: clear to auscultation b/l  GI: soft non tender . Gravid uterus  NEURO: AAOX3  Grossly non focal neuro exam  INTEGUMENT: no obvious rashes  LYMPHATIC: no palpable cervical, supraclavicular, axillary or inguinal LAD  MUSCULOSKELETAL: Unremarkable. No bony tenderness.   EXTREMITIES: Trace bilateral edema  PSYCH: Mentation, mood and affect are normal. Decision making capacity is intact    Laboratory/Imaging Studies  Results for orders placed or performed in visit on 09/25/18   HGB Eval Reflex to ELP or RBC Solubility   Result Value Ref Range    Hemoglobin A1 96.9 95.0 - 97.9 %    Hemoglobin A2 2.5 2.0 - 3.5 %    Hemoglobin F 0.6 0.0 - 2.1 %    Hemoglobin S Eval 0.0 0.0 - 0.0 %    Hemoglobin C 0.0 0.0 - 0.0 %    Hemoglobin E 0.0 0.0 - 0.0 %    Hemoglobin Other SEE NOTE 0.0 - 0.0 %    HGB Abn Evaluation Abnormal (A)     Sickle Cell Solubility Confirm Not Performed     Hemoglobin Capillary ELP Performed    Ferritin   Result Value Ref Range    Ferritin 120 12 - 150 ng/mL     ASSESSMENT/PLAN:    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 Her maternal grandmother is from [facility] and it is a possibility that she carries the trait.  As she has had no anemia prior to pregnancy, most likely she has the " heterozygous form, in which case Hb Oleg Khan may have no anemia and can have mild microcytosis.   Most likely this will not affect her throughout her life and she can continue to lead a pretty normal life.  On the other hand, Homozygous Hb Oleg Khan produces a mild to moderate hemolytic anemia with microcytosis and splenomegaly. I do not believe that she is homozygous for it.  Hb Oleg Khan can however be clinically significant if it is co-inherited with a double alpha globin deletion (--/aa), and then the patients have severe Hb H disease. This is clearly not the case for her     I recommend checking Alpha Thalassemia (HBA1 and HBA2) Sequencing to confirm the variant.  We will check that today.    She has had iron deficiency anemia in the past due to heavy menses and most recently during pregnancy when she was given Venofer.  I recommend continuing taking the prenatal vitamins with iron.  The prenatal vitamins also have folic acid so it is not unreasonable for her to continue taking it indefinitely for now.  We also discussed that I would recommend evaluation by a genetic counselor as this information would be very useful for her family including children.    I also would recommend Genetic Counseling as it might be a good information for her children.    We will be in touch after the results are available.  She can continue following with her OB doctor and primary care physician.  She can see me on an as-needed basis.    I answered all of her questions to her satisfaction.  She is agreeable and comfortable with the plan.    Willam Hood            Again, thank you for allowing me to participate in the care of your patient.        Sincerely,        Willam Hood MD

## 2018-10-19 NOTE — PROGRESS NOTES
Hematology initial visit:  Date on this visit: 10/19/2018    Jacqueline Bolden  is referred by Dr.Janis CHAY Moyer for a hematology consultation. 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  She tells me that pregnancy is otherwise going well and she is 40 weeks pregnant so she has the usual tiredness from pregnancy and some mild leg swelling.  Throughout her life she has had heavy periods although over the last few years they have not been as heavy.  Previously she was told that she has iron deficiency anemia and the first time she was told about it was when she was 16 years old.  Off-and-on she has taken oral iron.  During this pregnancy she was found to have iron deficiency and was given Venofer a couple of months back.  Prior to pregnancy her hemoglobin was normal with low normal MCV.  Otherwise she continues to feel well and denies any other complaints  like significant abnormal bleeding, infections, trouble breathing.  Denies GI problems apart from occasional loose stools.  As mentioned above otherwise pregnancy is going uncomplicated.    She tells me that her maternal grandmother is from [facility] and maternal aunts have some kind of anemia but she is not sure of the details.  1 of her brothers probably also has anemia but she is not sure of the details.  She tells me that they have been told that they need iron but they usually do not take it.      ROS:  A comprehensive ROS was otherwise neg      Past Medical/Surgical History:  Past Medical History:   Diagnosis Date     Dysmenorrhea      Irregular menstrual cycle       Surveillance of previously prescribed intrauterine contraceptive device 12/2008    paraguard     Past Surgical History:   Procedure Laterality Date     NO HISTORY OF SURGERY       Allergies:  Allergies as of 10/19/2018 - [place] as Reviewed 10/19/2018   Allergen Reaction Noted     No known drug allergies  08/13/2001     Current Medications:  Current Outpatient Prescriptions   Medication Sig Dispense Refill     Misc. Devices (BREAST PUMP) MISC 1 each continuous prn 1 each 0     Prenatal Multivit-Min-Fe-FA (PRENATAL VITAMINS PO)        VITAMIN D, CHOLECALCIFEROL, PO Take by mouth daily        Family History:  Family History   Problem Relation Age of Onset     Family History Negative No family hx of    Her maternal [facility] is from [facility].  She herself was born and raised in the [place].  No family history of bleeding or clotting disorder.  As mentioned above maternal aunts have some kind of anemia but she is not sure of the details.  1 of her brothers probably also has anemia but she is not sure of the details.  She tells me that they have been told that they need iron but they usually do not take it.    Social History:  Social History     Social History     Marital status:      Spouse name: N/A     Number of children: N/A     Years of education: N/A     Occupational History     student 0student     [employer]     [employer] school Student     Social History Main Topics     Smoking status: Former Smoker     Packs/day: 1.00     Types: Cigarettes     Smokeless tobacco: Never Used      Comment:  1 pack every 5 days     Alcohol use Yes      Comment: OCC.     Drug use: No     Sexual activity: Yes     Partners: Male     Birth control/ protection: None     Other Topics Concern     Not on file     Social History Narrative    Lives with housemates. No domestic violence issues.   She used to smoke but quit in 2012.  Drinks alcohol very occasionally.  She lives with her .  She works as  " for children [facility] [employer] health [employer]  Physical Exam:  /77  Pulse 89  Temp 98.2  F (36.8  C) (Oral)  Resp 16  Ht 1.57 m (5' 1.81\")  Wt 94.5 kg (208 lb 6 oz)  LMP 10/24/2017 (Approximate)  SpO2 96%  BMI 38.35 kg/m2  CONSTITUTIONAL: no acute distress  EYES: PERRLA, no palor or icterus.   ENT/MOUTH: no mouth lesions. Ears normal  CVS: s1s2 no m r g .   RESPIRATORY: clear to auscultation b/l  GI: soft non tender . Gravid uterus  NEURO: AAOX3  Grossly non focal neuro exam  INTEGUMENT: no obvious rashes  LYMPHATIC: no palpable cervical, supraclavicular, axillary or inguinal LAD  MUSCULOSKELETAL: Unremarkable. No bony tenderness.   EXTREMITIES: Trace bilateral edema  PSYCH: Mentation, mood and affect are normal. Decision making capacity is intact    Laboratory/Imaging Studies  Results for orders placed or performed in visit on 09/25/18   HGB Eval Reflex to ELP or RBC Solubility   Result Value Ref Range    Hemoglobin A1 96.9 95.0 - 97.9 %    Hemoglobin A2 2.5 2.0 - 3.5 %    Hemoglobin F 0.6 0.0 - 2.1 %    Hemoglobin S Eval 0.0 0.0 - 0.0 %    Hemoglobin C 0.0 0.0 - 0.0 %    Hemoglobin E 0.0 0.0 - 0.0 %    Hemoglobin Other SEE NOTE 0.0 - 0.0 %    HGB Abn Evaluation Abnormal (A)     Sickle Cell Solubility Confirm Not Performed     Hemoglobin Capillary ELP Performed    Ferritin   Result Value Ref Range    Ferritin 120 12 - 150 ng/mL     ASSESSMENT/PLAN:    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 Her maternal grandmother is from [facility] and it is a possibility that she carries the trait.  As she has had no anemia prior to pregnancy, most likely she has the heterozygous form, in which case Hb Oleg Khan may have no anemia and can have mild microcytosis.   Most likely this will not affect her throughout her life and she can continue to lead a pretty normal life.  On the other hand, Homozygous Hb Constant Spring " produces a mild to moderate hemolytic anemia with microcytosis and splenomegaly. I do not believe that she is homozygous for it.  Hb Constant Spring can however be clinically significant if it is co-inherited with a double alpha globin deletion (--/aa), and then the patients have severe Hb H disease. This is clearly not the case for her     I recommend checking Alpha Thalassemia (HBA1 and HBA2) Sequencing to confirm the variant.  We will check that today.    She has had iron deficiency anemia in the past due to heavy menses and most recently during pregnancy when she was given Venofer.  I recommend continuing taking the prenatal vitamins with iron.  The prenatal vitamins also have folic acid so it is not unreasonable for her to continue taking it indefinitely for now.  We also discussed that I would recommend evaluation by a genetic counselor as this information would be very useful for her family including children.    I also would recommend Genetic Counseling as it might be a good information for her children.    We will be in touch after the results are available.  She can continue following with her OB doctor and primary care physician.  She can see me on an as-needed basis.    I answered all of her questions to her satisfaction.  She is agreeable and comfortable with the plan.    Willam Hood

## 2018-10-19 NOTE — MR AVS SNAPSHOT
After Visit Summary   10/19/2018    Jacqueline Bolden    MRN: [id number]           Patient Information     Date Of Birth          1990        Visit Information        Provider Department      10/19/2018 11:45 AM Willam Hood MD [facility]        Today's Diagnoses     Hemoglobin Constant Spring trait    -  1      Care Instructions    Labs today    Follow with PCP and OB    Cont prenatal vitamins          Follow-ups after your visit        Your next 10 appointments already scheduled     Oct 23, 2018  9:45 AM CDT   [facility]ABL[facility] with PER[facility] Herrera [facility]    [address] [facility] [address] [phone]              Who to contact     If you have questions or need follow up information about today's clinic visit or your schedule please contact [facility] directly at [phone].  Normal or non-critical lab and imaging results will be communicated to you by FreeGameCreditshart, letter or phone within 4 business days after the clinic has received the results. If you do not hear from us within 7 days, please contact the clinic through FreeGameCreditshart or phone. If you have a critical or abnormal lab result, we will notify you by phone as soon as possible.  Submit refill requests through BeautyCon or call your pharmacy and they will forward the refill request to us. Please allow 3 business days for your refill to be completed.          Additional Information About Your Visit        MyChart Information     BeautyCon gives you secure access to your electronic health record. If you see a primary care provider, you can also send messages to your care team and make appointments. If you have questions, please call your primary care clinic.  If you do not have a primary care provider, please call [phone] and they will assist you.      BeautyCon is an electronic gateway that provides easy, online access to your  "medical records. With [employer], you can request a clinic appointment, read your test results, renew a prescription or communicate with your care team.     To access your existing account, please contact your [facility] or call [phone] for assistance.        Care EveryWhere ID     This is your Care EveryWhere ID. This could be used by other organizations to access your [facility] medical records  [id number]        Your Vitals Were     Pulse Temperature Respirations Height Last Period Pulse Oximetry    89 98.2  F (36.8  C) (Oral) 16 1.57 m (5' 1.81\") 10/24/2017 (Approximate) 96%    BMI (Body Mass Index)                   38.35 kg/m2            Blood Pressure from Last 3 Encounters:   10/19/18 118/77   10/16/18 122/74   10/09/18 112/72    Weight from Last 3 Encounters:   10/19/18 94.5 kg (208 lb 6 oz)   10/16/18 93.6 kg (206 lb 4 oz)   10/09/18 92.8 kg (204 lb 8 oz)              We Performed the Following     Alpha Thalassemia HBA1 HBA2 7 Deletions        Primary Care Provider Fax #    Physician No Ref-Primary [phone]       No address on file        Equal Access to Services     ARYA ZAMUDIO : Hadii elias Castellano, waaxda luqadaha, qaybta kaalmada adelucioyada, beatrice hobson. So [facility] [place] [facility] [phone].    ATENCIÓN: Si habla español, tiene a souza disposición servicios gratuitos de asistencia lingüística. [facility] [phone].    We comply with applicable federal civil rights laws and [place] laws. We do not discriminate on the basis of race, color, national origin, age, disability, sex, sexual orientation, or gender identity.            Thank you!     Thank you for choosing [facility]  for your care. Our goal is always to provide you with excellent care. Hearing back from our patients is one way we can continue to improve our services. Please take a few minutes to complete the written survey that you may receive in the mail after your " visit with us. Thank you!             Your Updated Medication List - Protect others around you: Learn how to safely use, store and throw away your medicines at www.disposemymeds.org.          This list is accurate as of 10/19/18 12:52 PM.  Always use your most recent med list.                   Brand Name Dispense Instructions for use Diagnosis    breast pump Misc     1 each    1 each continuous prn    Lactating mother       PRENATAL VITAMINS PO           VITAMIN D (CHOLECALCIFEROL) PO      Take by mouth daily

## 2018-10-21 ENCOUNTER — NURSE TRIAGE (OUTPATIENT)
Dept: NURSING | Facility: CLINIC | Age: 28
End: 2018-10-21

## 2018-10-21 ENCOUNTER — TRANSFERRED RECORDS (OUTPATIENT)
Dept: HEALTH INFORMATION MANAGEMENT | Facility: CLINIC | Age: 28
End: 2018-10-21

## 2018-10-21 ENCOUNTER — MYC MEDICAL ADVICE (OUTPATIENT)
Dept: OBGYN | Facility: OTHER | Age: 28
End: 2018-10-21

## 2018-10-21 LAB
ALT SERPL-CCNC: 174 IU/L (ref 12–68)
AST SERPL-CCNC: 17 IU/L (ref 15–37)
CREAT SERPL-MCNC: 0.73 MG/DL (ref 0.55–1.02)
GFR SERPL CREATININE-BSD FRML MDRD: >60 ML/MIN
GLUCOSE SERPL-MCNC: 81 MG/DL (ref 70–110)
INR PPP: 0.9 (ref 0.9–1.1)
POTASSIUM SERPL-SCNC: 3.5 MMOL/L (ref 3.5–5.1)

## 2018-10-21 NOTE — TELEPHONE ENCOUNTER
Reason for Call/Nurse Assessment:  29 y/o female 40 weeks pregnant calls about last few days she has had some severe itching, feet are the worst, rashes all over legs, saw PCP who prescribed  Benadryl and Cerva cream -  Nothing  helping, so raw in some areas almost bleeding, feels that this is now widespread and getting worse, has been reading about ICP and worried this might be what she has. Denies any tongue or oral swelling no throat swelling.     Reviewed protcols for hives and triaged her to go to the ER tonight to be seen since no improvement and widespread, she is also 40 weeks - she will go now to either [facility] or Ladan Hoffman RN - [place] [facility]  10/21/2018   2:10 AM    Reason for Disposition    [1] Widespread hives AND [2] onset < 2 hours of exposure to high-risk allergen (e.g., peanuts, tree nuts, fish or shellfish)    Additional Information    Negative: [1] Hoarseness or cough now AND [2] rapid onset    Negative: [1] Swollen tongue AND [2] rapid onset    Negative: Difficulty breathing or wheezing now    Negative: [1] Life-threatening reaction (anaphylaxis) in the past to similar substance (e.g., food, insect bite/sting, chemical, etc.) AND [2] < 2 hours since exposure    Negative: Shock suspected (e.g., cold/pale/clammy skin, too weak to stand, low BP, rapid pulse)    Negative: Difficult to awaken or acting confused  (e.g., disoriented, slurred speech)    Negative: Sounds like a life-threatening emergency to the triager    Negative: Swollen tongue    Protocols used: HIVES-ADULT-

## 2018-10-23 ENCOUNTER — PRENATAL OFFICE VISIT (OUTPATIENT)
Dept: OBGYN | Facility: OTHER | Age: 28
End: 2018-10-23
Payer: COMMERCIAL

## 2018-10-23 VITALS
SYSTOLIC BLOOD PRESSURE: 122 MMHG | BODY MASS INDEX: 37.86 KG/M2 | HEART RATE: 88 BPM | DIASTOLIC BLOOD PRESSURE: 78 MMHG | WEIGHT: 205.75 LBS

## 2018-10-23 DIAGNOSIS — O09.813 PREGNANCY RESULTING FROM IN VITRO FERTILIZATION IN THIRD TRIMESTER: Primary | ICD-10-CM

## 2018-10-23 LAB — MISCELLANEOUS TEST: NORMAL

## 2018-10-23 PROCEDURE — 99207 ZZC PRENATAL VISIT: CPT | Performed by: ADVANCED PRACTICE MIDWIFE

## 2018-10-23 NOTE — NURSING NOTE
"Chief Complaint   Patient presents with     Prenatal Care       Initial /78 (BP Location: Right arm, Patient Position: Sitting, Cuff Size: Adult Large)  Pulse 88  Wt 205 lb 12 oz (93.3 kg)  LMP 10/24/2017 (Approximate)  BMI 37.86 kg/m2 Estimated body mass index is 37.86 kg/(m^2) as calculated from the following:    Height as of 10/19/18: 5' 1.81\" (1.57 m).    Weight as of this encounter: 205 lb 12 oz (93.3 kg).  Medication Reconciliation: complete    Amaris Garcia CMA    "

## 2018-10-23 NOTE — MR AVS SNAPSHOT
After Visit Summary   10/23/2018    Jacqueline Bolden    MRN: [id number]           Patient Information     Date Of Birth          1990        Visit Information        Provider Department      10/23/2018 9:45 AM Ludy Jameson APRN CNM [facility]        Today's Diagnoses     Pregnancy resulting from in vitro fertilization in third trimester    -  1       Follow-ups after your visit        Who to contact     If you have questions or need follow up information about today's clinic visit or your schedule please contact [facility] directly at [phone].  Normal or non-critical lab and imaging results will be communicated to you by [employer] Mediahart, letter or phone within 4 business days after the clinic has received the results. If you do not hear from us within 7 days, please contact the clinic through [employer] Appt or phone. If you have a critical or abnormal lab result, we will notify you by phone as soon as possible.  Submit refill requests through SpamLion or call your pharmacy and they will forward the refill request to us. Please allow 3 business days for your refill to be completed.          Additional Information About Your Visit        MyChart Information     SpamLion gives you secure access to your electronic health record. If you see a primary care provider, you can also send messages to your care team and make appointments. If you have questions, please call your primary care clinic.  If you do not have a primary care provider, please call [phone] and they will assist you.        Care EveryWhere ID     This is your Care EveryWhere ID. This could be used by other organizations to access your [facility] medical records  [id number]        Your Vitals Were     Pulse Last Period BMI (Body Mass Index)             88 10/24/2017 (Approximate) 37.86 kg/m2          Blood Pressure from Last 3 Encounters:   10/23/18 122/78   10/19/18 118/77   10/16/18 122/74    Weight from  Last 3 Encounters:   10/23/18 205 lb 12 oz (93.3 kg)   10/19/18 208 lb 6 oz (94.5 kg)   10/16/18 206 lb 4 oz (93.6 kg)              Today, you had the following     No orders found for display       Primary Care Provider Fax #    Physician No Ref-Primary [phone]       No address on file        Equal Access to Services     [facility]: Hadii aad ku hadasho Soomaali, waaxda luqadaha, qaybta kaalmada adeegyada, waxay ederin hayaan adelucio kwabenanichole layessica . So [facility] [phone].    ATENCIÓN: Si habla español, tiene a souza disposición servicios gratuitos de asistencia lingüística. Llame al [phone].    We comply with applicable federal civil rights laws and [place] laws. We do not discriminate on the basis of race, color, national origin, age, disability, sex, sexual orientation, or gender identity.            Thank you!     Thank you for choosing [facility]  for your care. Our goal is always to provide you with excellent care. Hearing back from our patients is one way we can continue to improve our services. Please take a few minutes to complete the written survey that you may receive in the mail after your visit with us. Thank you!             Your Updated Medication List - Protect others around you: Learn how to safely use, store and throw away your medicines at www.disposemymeds.org.          This list is accurate as of 10/23/18 10:38 AM.  Always use your most recent med list.                   Brand Name Dispense Instructions for use Diagnosis    breast pump Misc     1 each    1 each continuous prn    Lactating mother       PRENATAL VITAMINS PO           VITAMIN D (CHOLECALCIFEROL) PO      Take by mouth daily

## 2018-10-23 NOTE — PROGRESS NOTES
Feeling well. Has induction date.   Reviewed ripening and induction.   Some contr/ no LOF/VB  Ludy Moyer, PERCY, CNM

## 2018-10-24 NOTE — TELEPHONE ENCOUNTER
"Jacqueline Bolden is a 28 year old female who calls with contractions and unsure if should go to L&D.    NURSING ASSESSMENT:  Description:  Mucous plug came out today. Contractions are about 5-10 minutes apart since around 2pm wrapping from the back to the stomach, takes breath away. Abdominal tightening. Having soft stools, diarrhea like. Ongoing nausea for 3 days. Feels like intense back cramping like she is about to have a BM. Denies gush of fluid, lower abdominal pain.  Onset/duration:  Since 2pm  Precip. factors:  pregnant  Associated symptoms:  Lower back pain radiating to stomach,   Improves/worsens symptoms:  Worsening   Pain scale (0-10)   \"a lot of pain but I don't know what labor pain is\"  LMP/preg/breast feeding:  Patient's last menstrual period was 10/24/2017 (approximate).  GA: 40w5d  KETURAH: Estimated Date of Delivery: Oct 19, 2018  Last exam/Treatment:  10/16/2018  Allergies:   Allergies   Allergen Reactions     No Known Drug Allergies      NURSING PLAN: Nursing advice to patient to go to L&D    RECOMMENDED DISPOSITION:  >20 weeks gestation: go to Birthing Center  Will comply with recommendation: Yes gave [facility] L&D handoff  If further questions/concerns or if symptoms do not improve, worsen or new symptoms develop, call your PCP or [facility] as soon as possible.    NOTES:  Disposition was determined by the first positive assessment question, therefore all previous assessment questions were negative    Guideline used:  Telephone Triage for Obstetrics and Gynecology, Sumaya Fish and Sujey Bonilla  3rd trimester Recognizing Labor  Nursing Judgment   Report given to [facility] L&D nurse Halima Hurtado, RN, BSN     "

## 2018-10-25 PROBLEM — N97.9 INFERTILITY, FEMALE: Status: ACTIVE | Noted: 2018-10-25

## 2018-10-31 ENCOUNTER — TELEPHONE (OUTPATIENT)
Dept: OBGYN | Facility: OTHER | Age: 28
End: 2018-10-31

## 2018-10-31 NOTE — TELEPHONE ENCOUNTER
Reason for Call:  Form, our goal is to have forms completed with 72 hours, however, some forms may require a visit or additional information.    Type of letter, form or note:  medical    Who is the form from?: [facility] (if other please explain)    Where did the form come from: form was faxed in    What clinic location was the form placed at?: [facility] - [phone]    Where the form was placed: [facility]    What number is listed as a contact on the form?: [phone]       Additional comments: please complete for,sign,date and return to fax [phone] SHAVON CARTWRIGHT    Call taken on 10/31/2018 at 2:09 PM by Lawanda Munoz

## 2018-10-31 NOTE — TELEPHONE ENCOUNTER
Ludy is on vacation and will not be in [place] until next Thursday. Contacted [place]  and they will fax form to Katlyn Yin to [place] so it can be completed in a timely manner.  Amaris Garcia CMA

## 2018-10-31 NOTE — TELEPHONE ENCOUNTER
Our goal is to have forms completed with 72 hours, however some forms may require a visit or additional information.    Who is the form from?: Insurance comp  Where the form came from: Patient or family brought in     What clinic location was the form placed at?: [facility]  Where the form was placed: 's Box  What number is listed as a contact on the form?:     Phone call message- patient request for a letter, form or note:    Date needed: as soon as possible  Please fax to [phone]  Has the patient signed a consent form for release of information? NO    Additional comments:     Call taken on 10/31/2018 at 1:53 PM by Naty Arcos    Type of letter, form or note: medical

## 2018-11-06 LAB
HBA1 GENE MUT ANL BLD/T: NORMAL
SPECIMEN SOURCE: NORMAL

## 2018-11-07 LAB
RESULT: NORMAL
SEND OUTS MISC TEST CODE: NORMAL
SEND OUTS MISC TEST SPECIMEN: NORMAL
TEST NAME: NORMAL

## 2018-11-08 ENCOUNTER — MYC MEDICAL ADVICE (OUTPATIENT)
Dept: OBGYN | Facility: OTHER | Age: 28
End: 2018-11-08

## 2018-11-08 ENCOUNTER — PRENATAL OFFICE VISIT (OUTPATIENT)
Dept: OBGYN | Facility: OTHER | Age: 28
End: 2018-11-08
Payer: COMMERCIAL

## 2018-11-08 VITALS
WEIGHT: 173.5 LBS | SYSTOLIC BLOOD PRESSURE: 110 MMHG | BODY MASS INDEX: 31.93 KG/M2 | HEART RATE: 84 BPM | DIASTOLIC BLOOD PRESSURE: 70 MMHG

## 2018-11-08 DIAGNOSIS — O99.019 ANEMIA AFFECTING PREGNANCY, ANTEPARTUM: ICD-10-CM

## 2018-11-08 DIAGNOSIS — Z98.890 POSTOPERATIVE STATE: Primary | ICD-10-CM

## 2018-11-08 LAB — HGB BLD-MCNC: 11.5 G/DL (ref 11.7–15.7)

## 2018-11-08 PROCEDURE — 85018 HEMOGLOBIN: CPT | Performed by: OBSTETRICS & GYNECOLOGY

## 2018-11-08 PROCEDURE — 36415 COLL VENOUS BLD VENIPUNCTURE: CPT | Performed by: OBSTETRICS & GYNECOLOGY

## 2018-11-08 PROCEDURE — 99024 POSTOP FOLLOW-UP VISIT: CPT | Performed by: OBSTETRICS & GYNECOLOGY

## 2018-11-08 RX ORDER — FERROUS SULFATE 324(65)MG
325 TABLET, DELAYED RELEASE (ENTERIC COATED) ORAL
COMMUNITY
Start: 2018-10-27 | End: 2020-01-06

## 2018-11-08 ASSESSMENT — PATIENT HEALTH QUESTIONNAIRE - PHQ9
10. IF YOU CHECKED OFF ANY PROBLEMS, HOW DIFFICULT HAVE THESE PROBLEMS MADE IT FOR YOU TO DO YOUR WORK, TAKE CARE OF THINGS AT HOME, OR GET ALONG WITH OTHER PEOPLE: NOT DIFFICULT AT ALL
SUM OF ALL RESPONSES TO PHQ QUESTIONS 1-9: 3
SUM OF ALL RESPONSES TO PHQ QUESTIONS 1-9: 3

## 2018-11-08 NOTE — LETTER
11/8/2018        RE: Jacqueline Bolden  [address]  [place] [address]        Jacqueline Bolden presents today for her post operative check up.  She had a CS for failure to progress.  My op note is copied below.      She is doing well.  No concerns at this time.  She is eating and drinking well.  No trouble voiding on her own.  No vaginal bleeding.  No pain at this time.  Incision  healing well.    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     Priority: Medium     Anemia affecting pregnancy 07/19/2018     Priority: Medium     Pregnancy conceived through in vitro fertilization 03/29/2018     Priority: Medium     CARDIOVASCULAR SCREENING; LDL GOAL LESS THAN 160 10/31/2010     Priority: Medium     Dysmenorrhea 12/01/2005     Priority: Medium       REVIEW OF SYSTEMS  See HPI, otherwise 10 point ROS neg    Physical Exam:  Vitals:    11/08/18 1308   BP: 110/70   BP Location: Right arm   Patient Position: Chair   Cuff Size: Adult Regular   Pulse: 84   Weight: 173 lb 8 oz (78.7 kg)       GENERAL APPEARANCE: well nourished, well hydrated, no acute distress  ABDOMEN: soft, non-distended. Surgical site incision intact and well healed.  Surrounding skin unremarkable      Post-op Impression:     Post op exam without complications    Recommendations:    Continue current restrictions    Hgb today    Follow up for pp visit    Will send note to RM&IA and patient agrees        Patient response: voiced understanding of recommendations, asked appropriate questions, states will comply with recommendations    Answers for HPI/ROS submitted by the patient on 11/8/2018   If you checked off any problems, how difficult have these problems made it for you to do your work, take care of things at home, or get along with other people?: Not difficult at all  PHQ9 TOTAL SCORE: 3      L&D  Delivery Note - Anna Norris MD - 10/25/2018 11:25 PM CDT   Post Operative Delivery Note    Delivery Report     Date: 10/25/2018  Name: Jacqueline Bolden  Age: 28 y.o.  GP:   GA: 40w6d    DATE OF SURGERY: 10/25/2018    Preop diagnosis:    28 y.o.  at 40w6d with spontaneous labor and SROM    Hgb H constant spring. Anemia s/p venofer earlier in pregnancy, now resolved    IVF pregnancy, tubal factor    Postop diagnosis: same,  macrosomia, PPH due to arterial bleeding from the hysterotomy.    Procedure: primary low transverse  section     Surgeon: Anna Norris MD    Asst: surgical assist    Anesthesia: Epidural     Findings: Liveborn female, Wt: 8 lbs 15 oz Apgar: 8 and 9 at one and five min respectively. Normal uterus tubes and ovaries. Placenta delivered spontaneously, intact, 3v cord    Specimens: cord blood     Grafts or implants: none     QBL: 1865 ml    IVF: See anesthesia records     Urine outpt: See anesthesia records     Complications: None    Procedure:    Patient was taken to the operating room where epidural anesthesia was placed and found to be adequate. She was prepared and draped in the normal sterile fashion in the dorsal supine position with a leftward tilt.     A Pfannenstiel incision was made with a scalpel and carried down to the underlying layer of fascia. The fascia was incised in the midline and this incision was extended laterally with Carcamo scissors. The fascia was dissected from the rectus muscles bluntly and sharply using the Carcamo scissors. The peritoneum was identified and bluntly entered.     The bladder was relatively high. It appeared the collier was not draining well. The RN inspected and adjusted the collier.     The Rikki retractor was placed.  Patient had some discomfort at this time.     The bladder flap was created with sharp and blunt dissection. The lower uterine segment incised with a scalpel in a transverse fashion and the incision  extended laterally bluntly. Clear fluid was noted. With the help of the surgical assist, the infants head was delivered atraumatically and handed off to the awaiting nursing staff. Cord blood sample was obtained.     Pt was having significant pain at this point, so she was given additional analgesia and anesthesia.    The placenta was delivered manually. There was some residual membranes and these were remove. The uterus cleared of all clots and debris. Large amount of bleeding noted. The fundus was firm. Difficulty assessing where the bleeding was coming from because the suction was not keeping up. Arterial bleeding noted from the right and left corners of the hysterotomy and also the inferior aspect of the hysterostomy. Clamped with ring forceps. Once the bleeding was better controlled, the uterus was again inspected. No atony and no residual membranes or placental tissue.     The uterus was closed with 0-Vicryl in a running locked fashion, taking care to incorporate the bleeidng arteries. An imbricating layer was placed with the same suture. Some bleeding noted along the bladder flap, controlled with the bovie. We noted hemostasis. The uterus was exteriorized and the gutters were cleared of all clots and debris. The broad ligaments were inspect and no hematoma appreciated. The incision was again inspected and hemostasis noted. The Rikki retractor was removed.     The bladder was still somewhat full/high.     We closed the peritoneum with 3-0 plain in a running fashion. We closed the facia with 0-vicryl in a running fashion. The subcutaneous layer was irrigated and the electrosurgical device used to cauterize any small areas of bleeding. It was reapproximated with 3-0 plain in a running fashion. The skin was closed with 4-0 monocryl subcuticularly. The incision was covered with Dermabond.     The patient tolerated the procedure well. Surgical counts were correct at the end of the procedure. She received  antibiotics prior to the incision. She was taken to the recovery room in stable condition, accompanied by her .     Anna Norris MD         Sincerely,        Anna Norris MD

## 2018-11-08 NOTE — MR AVS SNAPSHOT
After Visit Summary   11/8/2018    Jacqueline Bolden    MRN: [id number]           Patient Information     Date Of Birth          1990        Visit Information        Provider Department      11/8/2018 1:00 PM Anna Norris MD [facility]        Today's Diagnoses     Postoperative state    -  1    Anemia affecting pregnancy, antepartum           Follow-ups after your visit        Follow-up notes from your care team     Return in about 4 weeks (around 12/6/2018).      Your next 10 appointments already scheduled     Dec 03, 2018 11:15 AM CST   [facility] Visit with Anna Norris MD   [facility]    [address] [facility] [id number] [phone]           Bring a current list of meds and any records pertaining to this visit. For Physicals, please bring immunization records and any forms needing to be filled out. Please arrive 10 minutes early to complete paperwork.              Who to contact     If you have questions or need follow up information about today's clinic visit or your schedule please contact [facility] directly at [phone].  Normal or non-critical lab and imaging results will be communicated to you by [employer], letter or phone within 4 business days after the clinic has received the results. If you do not hear from us within 7 days, please contact the clinic through [employer] or phone. If you have a critical or abnormal lab result, we will notify you by phone as soon as possible.  Submit refill requests through [employer] or call your pharmacy and they will forward the refill request to us. Please allow 3 business days for your refill to be completed.          Additional Information About Your Visit        MyChart Information     [employer] gives you secure access to your electronic health record. If you see a primary care provider, you can also send messages to your care team and make appointments. If  you have questions, please call your primary care clinic.  If you do not have a primary care provider, please call [phone] and they will assist you.        Care EveryWhere ID     This is your Care EveryWhere ID. This could be used by other organizations to access your [facility] medical records  [id number]        Your Vitals Were     Pulse Last Period BMI (Body Mass Index)             84 10/24/2017 (Approximate) 31.93 kg/m2          Blood Pressure from Last 3 Encounters:   11/08/18 110/70   10/23/18 122/78   10/19/18 118/77    Weight from Last 3 Encounters:   11/08/18 173 lb 8 oz (78.7 kg)   10/23/18 205 lb 12 oz (93.3 kg)   10/19/18 208 lb 6 oz (94.5 kg)              We Performed the Following     Hemoglobin        Primary Care Provider Fax #    Physician No Ref-Primary [phone]       No address on file        Equal Access to Services     [facility] : Hadii elias Castellano, waaxda luqadaha, qaybta kaalmada ivan, beatrice bullock . So [facility] [phone].    ATENCIÓN: Si habla español, tiene a souza disposición servicios gratuitos de asistencia lingüística. Layla al [phone].    We comply with applicable federal civil rights laws and [place] laws. We do not discriminate on the basis of race, color, national origin, age, disability, sex, sexual orientation, or gender identity.            Thank you!     Thank you for choosing [facility]  for your care. Our goal is always to provide you with excellent care. Hearing back from our patients is one way we can continue to improve our services. Please take a few minutes to complete the written survey that you may receive in the mail after your visit with us. Thank you!             Your Updated Medication List - Protect others around you: Learn how to safely use, store and throw away your medicines at www.disposemymeds.org.          This list is accurate as of 11/8/18 11:59 PM.  Always use your most recent med  list.                   Brand Name Dispense Instructions for use Diagnosis    breast pump Misc     1 each    1 each continuous prn    Lactating mother       Ferrous Sulfate 324 (65 Fe) MG Tbec      Take 325 mg by mouth        PRENATAL VITAMINS PO           UNABLE TO FIND      MEDICATION NAME: Stool Softner        VITAMIN D (CHOLECALCIFEROL) PO      Take by mouth daily

## 2018-11-08 NOTE — PROGRESS NOTES
Jacqueline Bolden presents today for her post operative check up.  She had a CS for failure to progress.  My op note is copied below.      She is doing well.  No concerns at this time.  She is eating and drinking well.  No trouble voiding on her own.  No vaginal bleeding.  No pain at this time.  Incision  healing well.    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     Priority: Medium     Anemia affecting pregnancy 2018     Priority: Medium     Pregnancy conceived through in vitro fertilization 2018     Priority: Medium     CARDIOVASCULAR SCREENING; LDL GOAL LESS THAN 160 10/31/2010     Priority: Medium     Dysmenorrhea 2005     Priority: Medium       REVIEW OF SYSTEMS  See HPI, otherwise 10 point ROS neg    Physical Exam:  Vitals:    18 1308   BP: 110/70   BP Location: Right arm   Patient Position: Chair   Cuff Size: Adult Regular   Pulse: 84   Weight: 173 lb 8 oz (78.7 kg)       GENERAL APPEARANCE: well nourished, well hydrated, no acute distress  ABDOMEN: soft, non-distended. Surgical site incision intact and well healed.  Surrounding skin unremarkable      Post-op Impression:     Post op exam without complications    Recommendations:    Continue current restrictions    Hgb today    Follow up for pp visit    Will send note to RM&IA and patient agrees        Patient response: voiced understanding of recommendations, asked appropriate questions, states will comply with recommendations    Answers for HPI/ROS submitted by the patient on 2018   If you checked off any problems, how difficult have these problems made it for you to do your work, take care of things at home, or get along with other people?: Not difficult at all  PHQ9 TOTAL SCORE: 3      L&D Delivery Note - Anna Norris MD - 10/25/2018 11:25 PM CDT   Post Operative  Delivery Note    Delivery Report     Date: 10/25/2018  Name: Jacqueline Bolden  Age: 28 y.o.  GP:   GA: 40w6d    DATE OF SURGERY: 10/25/2018    Preop diagnosis:    28 y.o.  at 40w6d with spontaneous labor and SROM    Hgb H constant spring. Anemia s/p venofer earlier in pregnancy, now resolved    IVF pregnancy, tubal factor    Postop diagnosis: same,  macrosomia, PPH due to arterial bleeding from the hysterotomy.    Procedure: primary low transverse  section     Surgeon: Anna Norris MD    Asst: surgical assist    Anesthesia: Epidural     Findings: Liveborn female, Wt: 8 lbs 15 oz Apgar: 8 and 9 at one and five min respectively. Normal uterus tubes and ovaries. Placenta delivered spontaneously, intact, 3v cord    Specimens: cord blood     Grafts or implants: none     QBL: 1865 ml    IVF: See anesthesia records     Urine outpt: See anesthesia records     Complications: None    Procedure:    Patient was taken to the operating room where epidural anesthesia was placed and found to be adequate. She was prepared and draped in the normal sterile fashion in the dorsal supine position with a leftward tilt.     A Pfannenstiel incision was made with a scalpel and carried down to the underlying layer of fascia. The fascia was incised in the midline and this incision was extended laterally with Carcamo scissors. The fascia was dissected from the rectus muscles bluntly and sharply using the Carcamo scissors. The peritoneum was identified and bluntly entered.     The bladder was relatively high. It appeared the collier was not draining well. The RN inspected and adjusted the collier.     The Rikki retractor was placed.  Patient had some discomfort at this time.     The bladder flap was created with sharp and blunt dissection. The lower uterine segment incised with a scalpel in a transverse fashion and the incision extended laterally bluntly. Clear fluid was noted. With the help of the surgical assist, the  infants head was delivered atraumatically and handed off to the awaiting nursing staff. Cord blood sample was obtained.     Pt was having significant pain at this point, so she was given additional analgesia and anesthesia.    The placenta was delivered manually. There was some residual membranes and these were remove. The uterus cleared of all clots and debris. Large amount of bleeding noted. The fundus was firm. Difficulty assessing where the bleeding was coming from because the suction was not keeping up. Arterial bleeding noted from the right and left corners of the hysterotomy and also the inferior aspect of the hysterostomy. Clamped with ring forceps. Once the bleeding was better controlled, the uterus was again inspected. No atony and no residual membranes or placental tissue.     The uterus was closed with 0-Vicryl in a running locked fashion, taking care to incorporate the bleeidng arteries. An imbricating layer was placed with the same suture. Some bleeding noted along the bladder flap, controlled with the bovie. We noted hemostasis. The uterus was exteriorized and the gutters were cleared of all clots and debris. The broad ligaments were inspect and no hematoma appreciated. The incision was again inspected and hemostasis noted. The Rikki retractor was removed.     The bladder was still somewhat full/high.     We closed the peritoneum with 3-0 plain in a running fashion. We closed the facia with 0-vicryl in a running fashion. The subcutaneous layer was irrigated and the electrosurgical device used to cauterize any small areas of bleeding. It was reapproximated with 3-0 plain in a running fashion. The skin was closed with 4-0 monocryl subcuticularly. The incision was covered with Dermabond.     The patient tolerated the procedure well. Surgical counts were correct at the end of the procedure. She received antibiotics prior to the incision. She was taken to the recovery room in stable condition, accompanied  by her .     Anna Norris MD

## 2018-11-08 NOTE — NURSING NOTE
"Chief Complaint   Patient presents with     Surgical Followup      incision check       Initial /70 (BP Location: Right arm, Patient Position: Chair, Cuff Size: Adult Regular)  Pulse 84  Wt 173 lb 8 oz (78.7 kg)  LMP 10/24/2017 (Approximate)  BMI 31.93 kg/m2 Estimated body mass index is 31.93 kg/(m^2) as calculated from the following:    Height as of 10/19/18: 5' 1.81\" (1.57 m).    Weight as of this encounter: 173 lb 8 oz (78.7 kg).  BP completed using cuff size: regular        The following HM Due: NONE      The following patient reported/Care Every where data was sent to:  P ABSTRACT QUALITY INITIATIVES [29703]       N/a    Penelope Garza CMA  2018           "

## 2018-11-09 ASSESSMENT — PATIENT HEALTH QUESTIONNAIRE - PHQ9: SUM OF ALL RESPONSES TO PHQ QUESTIONS 1-9: 3

## 2018-11-16 ENCOUNTER — TELEPHONE (OUTPATIENT)
Dept: ONCOLOGY | Facility: CLINIC | Age: 28
End: 2018-11-16

## 2018-11-16 DIAGNOSIS — D58.2: Primary | ICD-10-CM

## 2018-11-16 NOTE — TELEPHONE ENCOUNTER
----- Message from Tita Montiel sent at 11/16/2018  1:47 PM CST -----  Regarding: FW: Genetics referral  Please schedule this pt for a new pt appt.      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

## 2018-11-16 NOTE — PROGRESS NOTES
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 Baby girl is doing well    We discussed the results.    I recommend following with Genetic counselor. She agrees. I gave her referral for that.    She will see me as needed    All questions answered and she is comfortable with the plan    Willam Hood

## 2018-12-13 ENCOUNTER — TELEPHONE (OUTPATIENT)
Dept: ONCOLOGY | Facility: CLINIC | Age: 28
End: 2018-12-13

## 2018-12-13 NOTE — TELEPHONE ENCOUNTER
Patient needs a Genetic Counseling appointment scheduled. I spoke to the patient and she has decided to call back when she is ready to schedule the appointment.

## 2019-07-25 ENCOUNTER — MEDICAL CORRESPONDENCE (OUTPATIENT)
Dept: HEALTH INFORMATION MANAGEMENT | Facility: CLINIC | Age: 29
End: 2019-07-25

## 2019-07-29 DIAGNOSIS — Z13.21 ENCOUNTER FOR VITAMIN DEFICIENCY SCREENING: ICD-10-CM

## 2019-07-29 DIAGNOSIS — Z31.41 FERTILITY TESTING: ICD-10-CM

## 2019-07-29 DIAGNOSIS — Z11.8 SPECIAL SCREENING EXAMINATION FOR VIRAL AND CHLAMYDIAL DISEASE: Primary | ICD-10-CM

## 2019-07-29 DIAGNOSIS — Z11.59 SPECIAL SCREENING EXAMINATION FOR VIRAL AND CHLAMYDIAL DISEASE: Primary | ICD-10-CM

## 2019-07-29 LAB
HGB BLD-MCNC: 12.9 G/DL (ref 11.7–15.7)
TSH SERPL DL<=0.005 MIU/L-ACNC: 0.88 MU/L (ref 0.4–4)

## 2019-07-29 PROCEDURE — 36415 COLL VENOUS BLD VENIPUNCTURE: CPT | Performed by: NURSE PRACTITIONER

## 2019-07-29 PROCEDURE — 86803 HEPATITIS C AB TEST: CPT | Performed by: NURSE PRACTITIONER

## 2019-07-29 PROCEDURE — 85018 HEMOGLOBIN: CPT | Performed by: NURSE PRACTITIONER

## 2019-07-29 PROCEDURE — 82306 VITAMIN D 25 HYDROXY: CPT | Performed by: NURSE PRACTITIONER

## 2019-07-29 PROCEDURE — 87340 HEPATITIS B SURFACE AG IA: CPT | Performed by: NURSE PRACTITIONER

## 2019-07-29 PROCEDURE — 84443 ASSAY THYROID STIM HORMONE: CPT | Performed by: NURSE PRACTITIONER

## 2019-07-29 PROCEDURE — 87389 HIV-1 AG W/HIV-1&-2 AB AG IA: CPT | Performed by: NURSE PRACTITIONER

## 2019-07-30 LAB
DEPRECATED CALCIDIOL+CALCIFEROL SERPL-MC: 35 UG/L (ref 20–75)
HBV SURFACE AG SERPL QL IA: NONREACTIVE
HCV AB SERPL QL IA: NONREACTIVE
HIV 1+2 AB+HIV1 P24 AG SERPL QL IA: NONREACTIVE

## 2019-08-30 ENCOUNTER — TRANSFERRED RECORDS (OUTPATIENT)
Dept: HEALTH INFORMATION MANAGEMENT | Facility: CLINIC | Age: 29
End: 2019-08-30

## 2019-09-23 ENCOUNTER — MYC MEDICAL ADVICE (OUTPATIENT)
Dept: OBGYN | Facility: OTHER | Age: 29
End: 2019-09-23

## 2019-09-23 NOTE — TELEPHONE ENCOUNTER
Pt have a mole on my back... it's about the size of my pinky and it's constantly tingling.Writer advised that pt should see FP, pt stating Dr. Norris told her she would be her primary and see her for all health concerns.     Routing to MD to clarify if she would see pt for this or if pt should establish care with an FP.    Renea Looney RN on 9/23/2019 at 12:37 PM

## 2019-09-27 ENCOUNTER — HEALTH MAINTENANCE LETTER (OUTPATIENT)
Age: 29
End: 2019-09-27

## 2019-10-08 DIAGNOSIS — Z11.59 NEED FOR HEPATITIS C SCREENING TEST: Primary | ICD-10-CM

## 2019-10-08 DIAGNOSIS — Z13.21 ENCOUNTER FOR VITAMIN DEFICIENCY SCREENING: ICD-10-CM

## 2019-10-08 DIAGNOSIS — Z31.41 FERTILITY TESTING: ICD-10-CM

## 2019-10-15 ENCOUNTER — TRANSFERRED RECORDS (OUTPATIENT)
Dept: HEALTH INFORMATION MANAGEMENT | Facility: CLINIC | Age: 29
End: 2019-10-15

## 2019-11-19 ENCOUNTER — OFFICE VISIT (OUTPATIENT)
Dept: DERMATOLOGY | Facility: CLINIC | Age: 29
End: 2019-11-19
Attending: OBSTETRICS & GYNECOLOGY
Payer: COMMERCIAL

## 2019-11-19 DIAGNOSIS — D23.9 DERMATOFIBROMA: Primary | ICD-10-CM

## 2019-11-19 PROCEDURE — 99202 OFFICE O/P NEW SF 15 MIN: CPT | Performed by: DERMATOLOGY

## 2019-11-19 ASSESSMENT — PAIN SCALES - GENERAL: PAINLEVEL: NO PAIN (0)

## 2019-11-19 NOTE — NURSING NOTE
Jacqueline MARTIR Bolden's goals for this visit include:   Chief Complaint   Patient presents with     Skin Check     Jacqueline is visiting for a spot check of a mole the back; area has changed over time in size      She requests these members of her care team be copied on today's visit information:     PCP: No Ref-Primary, Physician    Referring Provider:  Anna Norris MD  [address] [place] [address]    There were no vitals taken for this visit.    Do you need any medication refills at today's visit? No    Kera Johnson LPN

## 2019-11-19 NOTE — LETTER
"    2019         RE: Jacqueline Bolden  [address] [facility] [address]        Dear Colleague,    Thank you for referring your patient, Jacqueline Bolden, to the [facility]. Please see a copy of my visit note below.    [facility] Dermatology Note      Dermatology Problem List:  1. DF: left mid back    Encounter Date: 2019    CC:  Chief Complaint   Patient presents with     Skin Check     Jacqueline is visiting for a spot check of a mole the back; area has changed over time in size          History of Present Illness:  Ms. Jacqueline Bolden is a 29 year old female who presents as a referral from Dr. Anna Norris.  She is here today for a lesion on the back.  She states that the lesion has changed over time. It is sometimes \"tingly.\" She has had it for years, tingling sensation is new. Appearance does not bother her.    No other concerns addressed today.     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 Her smoking use included cigarettes. She smoked 1.00 pack per day. She has never used smokeless tobacco. She reports current alcohol use. She reports that she does not use drugs.  She is an     Family History:  Negative for skin cancer.     Medications:  Current Outpatient Medications   Medication Sig Dispense Refill     Estradiol (ESTRACE PO) Take by mouth 3 times daily       Ferrous Sulfate 324 (65 Fe) MG TBEC Take 325 mg by mouth       Misc. Devices (BREAST PUMP) MISC 1 each continuous prn " 1 each 0     Prenatal Multivit-Min-Fe-FA (PRENATAL VITAMINS PO)        Progesterone (ENDOMETRIN VA)        progesterone, in olive oil, 100 MG/ML CMPD injection Inject 100 mg into the muscle daily       UNABLE TO FIND MEDICATION NAME: Stool Softner       VITAMIN D, CHOLECALCIFEROL, PO Take by mouth daily         Allergies   Allergen Reactions     No Known Drug Allergies        Review of Systems:  -Constitutional:  Feeling well, in usual state of health.  -Skin:  As per HPI, no additional concerns.      Physical exam:  Vitals: There were no vitals taken for this visit.  GEN: This is a well developed, well-nourished female in no acute distress, in a pleasant mood.    SKIN: Focused examination of the face and back was performed.  -Danielle skin type: II  -There is a firm tan/flesh colored papule that dimples with lateral pressure on the left mid back.  -No other lesions of concern on areas examined.       Impression/Plan:  1. Dermatofibroma, Left Mid Back: explained diagnosis. No further intervention required. Patient to report changes.     CC Anna Norris MD  [address] [place] [address] on close of this encounter.    Follow-up prn for new or changing lesions.       Staff Involved:  Scribe/Staff    Scribe Disclosure:   I, Kami Lozano, am serving as a scribe to document services personally performed by Dr. Mckenzie, based on data collection and the provider's statements to me.       Provider Disclosure:   The documentation recorded by the scribe accurately reflects the services I personally performed and the decisions made by me.    Mariposa Mckenzie MD    Department of Dermatology  [facility]: Phone: [phone], Fax:[phone]  [facility]: Phone: [phone], Fax: [phone]            Again, thank you for allowing me to participate in the care of your patient.         Sincerely,        Mariposa Mckenzie MD

## 2019-11-19 NOTE — PROGRESS NOTES
"[facility] Dermatology Note      Dermatology Problem List:  1. DF: left mid back    Encounter Date: 2019    CC:  Chief Complaint   Patient presents with     Skin Check     Jacqueline is visiting for a spot check of a mole the back; area has changed over time in size          History of Present Illness:  Ms. Jacqueline Bolden is a 29 year old female who presents as a referral from Dr. Anna Norris.  She is here today for a lesion on the back.  She states that the lesion has changed over time. It is sometimes \"tingly.\" She has had it for years, tingling sensation is new. Appearance does not bother her.    No other concerns addressed today.     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 Her smoking use included cigarettes. She smoked 1.00 pack per day. She has never used smokeless tobacco. She reports current alcohol use. She reports that she does not use drugs.  She is an     Family History:  Negative for skin cancer.     Medications:  Current Outpatient Medications   Medication Sig Dispense Refill     Estradiol (ESTRACE PO) Take by mouth 3 times daily       Ferrous Sulfate 324 (65 Fe) MG TBEC Take 325 mg by mouth       Misc. Devices (BREAST PUMP) MISC 1 each continuous prn 1 each 0     Prenatal Multivit-Min-Fe-FA (PRENATAL VITAMINS PO)        Progesterone (ENDOMETRIN VA)        progesterone, in olive oil, 100 MG/ML CMPD injection Inject 100 mg into the muscle daily       UNABLE TO FIND MEDICATION NAME: Stool Softner   "     VITAMIN D, CHOLECALCIFEROL, PO Take by mouth daily         Allergies   Allergen Reactions     No Known Drug Allergies        Review of Systems:  -Constitutional:  Feeling well, in usual state of health.  -Skin:  As per HPI, no additional concerns.      Physical exam:  Vitals: There were no vitals taken for this visit.  GEN: This is a well developed, well-nourished female in no acute distress, in a pleasant mood.    SKIN: Focused examination of the face and back was performed.  -Danielle skin type: II  -There is a firm tan/flesh colored papule that dimples with lateral pressure on the left mid back.  -No other lesions of concern on areas examined.       Impression/Plan:  1. Dermatofibroma, Left Mid Back: explained diagnosis. No further intervention required. Patient to report changes.     CC Anna Norris MD  [address] [place] [address] on close of this encounter.    Follow-up prn for new or changing lesions.       Staff Involved:  Scribe/Staff    Scribe Disclosure:   I, Kami Lozano, am serving as a scribe to document services personally performed by Dr. Mckenzie, based on data collection and the provider's statements to me.       Provider Disclosure:   The documentation recorded by the scribe accurately reflects the services I personally performed and the decisions made by me.    Mariposa Mckenzie MD    Department of Dermatology  [facility]: Phone: [phone], Fax:[phone]  [facility]: Phone: [phone], Fax: [phone]

## 2019-12-18 ENCOUNTER — TRANSFERRED RECORDS (OUTPATIENT)
Dept: HEALTH INFORMATION MANAGEMENT | Facility: CLINIC | Age: 29
End: 2019-12-18

## 2020-01-06 ENCOUNTER — PRENATAL OFFICE VISIT (OUTPATIENT)
Dept: OBGYN | Facility: OTHER | Age: 30
End: 2020-01-06
Payer: COMMERCIAL

## 2020-01-06 VITALS
DIASTOLIC BLOOD PRESSURE: 74 MMHG | SYSTOLIC BLOOD PRESSURE: 122 MMHG | BODY MASS INDEX: 31.19 KG/M2 | HEART RATE: 74 BPM | WEIGHT: 169.5 LBS

## 2020-01-06 DIAGNOSIS — D58.2: ICD-10-CM

## 2020-01-06 DIAGNOSIS — O09.811 PREGNANCY RESULTING FROM IN VITRO FERTILIZATION IN FIRST TRIMESTER: Primary | ICD-10-CM

## 2020-01-06 DIAGNOSIS — O34.219 HISTORY OF CESAREAN DELIVERY AFFECTING PREGNANCY: ICD-10-CM

## 2020-01-06 DIAGNOSIS — O09.299 HISTORY OF MACROSOMIA IN INFANT IN PRIOR PREGNANCY, CURRENTLY PREGNANT: ICD-10-CM

## 2020-01-06 LAB
ABO + RH BLD: NORMAL
ABO + RH BLD: NORMAL
ALBUMIN UR-MCNC: NEGATIVE MG/DL
APPEARANCE UR: CLEAR
BILIRUB UR QL STRIP: NEGATIVE
BLD GP AB SCN SERPL QL: NORMAL
BLOOD BANK CMNT PATIENT-IMP: NORMAL
COLOR UR AUTO: YELLOW
ERYTHROCYTE [DISTWIDTH] IN BLOOD BY AUTOMATED COUNT: 12.1 % (ref 10–15)
GLUCOSE UR STRIP-MCNC: NEGATIVE MG/DL
HCT VFR BLD AUTO: 39.1 % (ref 35–47)
HGB BLD-MCNC: 12.9 G/DL (ref 11.7–15.7)
HGB UR QL STRIP: NEGATIVE
KETONES UR STRIP-MCNC: NEGATIVE MG/DL
LEUKOCYTE ESTERASE UR QL STRIP: NEGATIVE
MCH RBC QN AUTO: 26.9 PG (ref 26.5–33)
MCHC RBC AUTO-ENTMCNC: 33 G/DL (ref 31.5–36.5)
MCV RBC AUTO: 82 FL (ref 78–100)
NITRATE UR QL: NEGATIVE
PH UR STRIP: 6 PH (ref 5–7)
PLATELET # BLD AUTO: 261 10E9/L (ref 150–450)
RBC # BLD AUTO: 4.8 10E12/L (ref 3.8–5.2)
SOURCE: NORMAL
SP GR UR STRIP: >1.03 (ref 1–1.03)
SPECIMEN EXP DATE BLD: NORMAL
UROBILINOGEN UR STRIP-ACNC: 0.2 EU/DL (ref 0.2–1)
WBC # BLD AUTO: 9.5 10E9/L (ref 4–11)

## 2020-01-06 PROCEDURE — 87389 HIV-1 AG W/HIV-1&-2 AB AG IA: CPT | Performed by: OBSTETRICS & GYNECOLOGY

## 2020-01-06 PROCEDURE — 85027 COMPLETE CBC AUTOMATED: CPT | Performed by: OBSTETRICS & GYNECOLOGY

## 2020-01-06 PROCEDURE — 87340 HEPATITIS B SURFACE AG IA: CPT | Performed by: OBSTETRICS & GYNECOLOGY

## 2020-01-06 PROCEDURE — 81003 URINALYSIS AUTO W/O SCOPE: CPT | Performed by: OBSTETRICS & GYNECOLOGY

## 2020-01-06 PROCEDURE — 36415 COLL VENOUS BLD VENIPUNCTURE: CPT | Performed by: OBSTETRICS & GYNECOLOGY

## 2020-01-06 PROCEDURE — 86850 RBC ANTIBODY SCREEN: CPT | Performed by: OBSTETRICS & GYNECOLOGY

## 2020-01-06 PROCEDURE — 86900 BLOOD TYPING SEROLOGIC ABO: CPT | Performed by: OBSTETRICS & GYNECOLOGY

## 2020-01-06 PROCEDURE — 99207 ZZC FIRST OB VISIT: CPT | Performed by: OBSTETRICS & GYNECOLOGY

## 2020-01-06 PROCEDURE — 86762 RUBELLA ANTIBODY: CPT | Performed by: OBSTETRICS & GYNECOLOGY

## 2020-01-06 PROCEDURE — 87086 URINE CULTURE/COLONY COUNT: CPT | Performed by: OBSTETRICS & GYNECOLOGY

## 2020-01-06 PROCEDURE — 86901 BLOOD TYPING SEROLOGIC RH(D): CPT | Performed by: OBSTETRICS & GYNECOLOGY

## 2020-01-06 PROCEDURE — 86780 TREPONEMA PALLIDUM: CPT | Performed by: OBSTETRICS & GYNECOLOGY

## 2020-01-06 NOTE — NURSING NOTE
"Chief Complaint   Patient presents with     Prenatal Care     New Prenatal       Initial /74 (BP Location: Left arm, Cuff Size: Adult Regular)   Pulse 74   Wt 76.9 kg (169 lb 8 oz)   BMI 31.19 kg/m   Estimated body mass index is 31.19 kg/m  as calculated from the following:    Height as of 10/19/18: 1.57 m (5' 1.81\").    Weight as of this encounter: 76.9 kg (169 lb 8 oz).  BP completed using cuff size: regular        PHQ-9 score:    PHQ-9 SCORE 2018   PHQ-9 Total Score MyChart 3 (Minimal depression)   PHQ-9 Total Score 3         Anna Guerin MA on 2020 at 3:13 PM    "

## 2020-01-06 NOTE — PROGRESS NOTES
29 year old  at 11w2d presents for New OB appointment.  Estimated Date of Delivery: 2020 by date of conception.    Frozen embryo transfer at [facility], Dr. Lundberg.  US measuring 8w 2d with KETURAH 20.  Date of conception 19.     Patient has seen [facility] for her hb constant spring variant.  Genetic counseling was recommended but she declined.     No complaints.  No vaginal bleeding, no leaking fluid, no contractions.      Electronic chart, including labs and imaging reviewed.    Last PHQ-9 score on record=   PHQ-9 SCORE 2018   PHQ-9 Total Score MyChart 3 (Minimal depression)   PHQ-9 Total Score 3       Obstetric History  OB History    Para Term  AB Living   2 1 1 0 0 1   SAB TAB Ectopic Multiple Live Births   0 0 0 0 1      # Outcome Date GA Lbr Saurabh/2nd Weight Sex Delivery Anes PTL Lv   2 Current            1 Term 10/25/18 40w6d  4.054 kg (8 lb 15 oz) F CS-LTranv EPI N DARCY      Complications: Failure to Progress in Second Stage, Postpartum hemorrhage, third stage, delivered,  macrosomia      Name: Rojas      Apgar1: 8  Apgar5: 9       Gynecologic History  Last Pap:   Lab Results   Component Value Date    PAP NIL 2018    PAP NIL 2008    PAP NIL 2007       Most Recent Immunizations   Administered Date(s) Administered     HepB 08/15/2001     Hib (PRP-T) 1993     Historical DTP/aP 1994     MMR 08/15/2001     OPV, trivalent, live 1994     TD (ADULT, 7+) 2004     TDAP Vaccine (Adacel) 2018     Varicella Pt Report Hx of Varicella/Chicken Pox 1999   Pended Date(s) Pended     TDAP Vaccine (Adacel) 2018        Patient Active Problem List   Diagnosis     Dysmenorrhea     CARDIOVASCULAR SCREENING; LDL GOAL LESS THAN 160     Pregnancy conceived through in vitro fertilization     Anemia affecting pregnancy     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 Devices (BREAST PUMP) MISC     Progesterone (ENDOMETRIN VA)     progesterone, in olive oil, 100 MG/ML CMPD injection     UNABLE TO FIND     No current facility-administered medications for this visit.        Allergies   Allergen Reactions     No Known Drug Allergies        History  Past Medical History:   Diagnosis Date     Dysmenorrhea      Past Surgical History:   Procedure Laterality Date      SECTION  10/25/2018       Social History     Socioeconomic History     Marital status:      Spouse name: Not on file     Number of children: Not on file     Years of education: Not on file     Highest education level: Not on file   Occupational History     Occupation: student     Employer: [employer]UDENT     Comment: [employer]     Occupation: [employer]     Employer: STUDENT   Social Needs     Financial resource strain: Not on file     Food insecurity:     Worry: Not on file     Inability: Not on file     Transportation needs:     Medical: Not on file     Non-medical: Not on file   Tobacco Use     Smoking status: Former Smoker     Packs/day: 1.00     Types: Cigarettes     Smokeless tobacco: Never Used     Tobacco comment:  1 pack every 5 days   Substance and Sexual Activity     Alcohol use: Yes     Comment: OCC.     Drug use: No     Sexual activity: Yes     Partners: Male     Birth control/protection: None   Lifestyle     Physical activity:     Days per week: Not on file     Minutes per session: Not on file     Stress: Not on file   Relationships     Social connections:     Talks on phone: Not on file     Gets together: Not on file     Attends [employer] service: Not on file     Active member of club or organization: Not on file     Attends meetings of clubs or  organizations: Not on file     Relationship status: Not on file     Intimate partner violence:     Fear of current or ex partner: Not on file     Emotionally abused: Not on file     Physically abused: Not on file     Forced sexual activity: Not on file   Other Topics Concern     Parent/sibling w/ CABG, MI or angioplasty before 65F 55M? Not Asked   Social History Narrative    Lives with housemates. No domestic violence issues.       ROS - Please see HPI, otherwise 10pt ROS negative.      Physical Exam  Vitals: /74 (BP Location: Left arm, Cuff Size: Adult Regular)   Pulse 74   Wt 76.9 kg (169 lb 8 oz)   BMI 31.19 kg/m    BMI= Body mass index is 31.19 kg/m .  Gen: Alert and oriented times 3, no acute distress.  Well developed, well nourished, pleasant.    Neck: Supple, no masses.  No thyromegaly.  Chest:  Non labored.  Clear to auscultation bilaterally.    Heart: Regular, normal S1, S2.  No murmurs.   Abdomen: Soft, nontender, nondistended.  No palpable masses.    :  Normal female external genitalia, Jef stage V.  No lesions.  Speculum exam reveals a normal vaginal vault, normal cervix.  No abnormal discharge.  Bimanual exam reveals a normal, mobile, nontender uterus, size consistent with dates.  No cervical motion tenderness.  Adnexa nontender with no palpable masses.   Extremities:  Nontender, no edema.      Assessment and Plan:  29 year old  with IUP at 11w2d.        ICD-10-CM    1. Pregnancy resulting from in vitro fertilization in first trimester O09.811 ABO/Rh type and screen     HIV Antigen Antibody Combo     Rubella Antibody IgG Quantitative     Hepatitis B surface antigen     Treponema Abs w Reflex to RPR and Titer     CBC with platelets     *UA reflex to Microscopic     Urine Culture Aerobic Bacterial   2. 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 History of  delivery affecting pregnancy O34.219    4. History of macrosomia in infant in prior pregnancy, currently  pregnant O09.299 Glucose tolerance gest screen 1 hour       Plan to refer to [facility] due to IVF pregnancy.  History of anemia with last pregnancy, received venofer.    Discussed genetic screening options:  Leaning against  Plan early GCT due to history of macrosomia  Discuss repeat CS vs TOLAC at future visit.  Leaning towards repeat  PPH due to arterial bleeding along hysterostomy at the time of the CS.  Ordered labs and ultrasound.     Folder given, outlining physician coverage, exercise, weight gain, schedule of visits, routine and indicated ultrasounds, prenatal vitamins and childbirth education.    Body mass index is 31.19 kg/m . - recommend  11-20 lbs (BMI >30)   Return in 4 weeks for routine OB care      30 minutes was spent face to face with the patient today discussing her history, diagnosis, and follow-up plan as noted above.  Over 50% of the visit was spent in counseling and coordination of care.    Anna Norris MD FACOG

## 2020-01-07 LAB
BACTERIA SPEC CULT: NORMAL
HBV SURFACE AG SERPL QL IA: NONREACTIVE
HIV 1+2 AB+HIV1 P24 AG SERPL QL IA: NONREACTIVE
Lab: NORMAL
RUBV IGG SERPL IA-ACNC: 15 IU/ML
SPECIMEN SOURCE: NORMAL
T PALLIDUM AB SER QL: NONREACTIVE

## 2020-01-08 ENCOUNTER — MYC MEDICAL ADVICE (OUTPATIENT)
Dept: OBGYN | Facility: OTHER | Age: 30
End: 2020-01-08

## 2020-01-08 NOTE — TELEPHONE ENCOUNTER
29 year old patient.  with IUP at 11w2d.  History of anemia with last pregnancy, received infusion for iron supplementation (venofer). Patient has seen [facility] for her hemoglobin constant spring variant.     Anne rS RN on 2020 at 10:24 AM

## 2020-01-13 DIAGNOSIS — O09.299 HISTORY OF MACROSOMIA IN INFANT IN PRIOR PREGNANCY, CURRENTLY PREGNANT: ICD-10-CM

## 2020-01-13 PROCEDURE — 36415 COLL VENOUS BLD VENIPUNCTURE: CPT | Performed by: OBSTETRICS & GYNECOLOGY

## 2020-01-13 PROCEDURE — 82950 GLUCOSE TEST: CPT | Performed by: OBSTETRICS & GYNECOLOGY

## 2020-01-14 LAB — GLUCOSE 1H P 50 G GLC PO SERPL-MCNC: 97 MG/DL (ref 60–129)

## 2020-01-30 ENCOUNTER — TELEPHONE (OUTPATIENT)
Dept: OBGYN | Facility: OTHER | Age: 30
End: 2020-01-30

## 2020-01-30 ENCOUNTER — PRENATAL OFFICE VISIT (OUTPATIENT)
Dept: OBGYN | Facility: OTHER | Age: 30
End: 2020-01-30
Payer: COMMERCIAL

## 2020-01-30 VITALS
DIASTOLIC BLOOD PRESSURE: 66 MMHG | BODY MASS INDEX: 31.1 KG/M2 | HEART RATE: 60 BPM | SYSTOLIC BLOOD PRESSURE: 104 MMHG | WEIGHT: 169 LBS

## 2020-01-30 DIAGNOSIS — O09.812 PREGNANCY RESULTING FROM IN VITRO FERTILIZATION IN SECOND TRIMESTER: ICD-10-CM

## 2020-01-30 DIAGNOSIS — Z34.82 ENCOUNTER FOR SUPERVISION OF OTHER NORMAL PREGNANCY IN SECOND TRIMESTER: Primary | ICD-10-CM

## 2020-01-30 PROCEDURE — 99207 ZZC PRENATAL VISIT: CPT | Performed by: ADVANCED PRACTICE MIDWIFE

## 2020-01-30 NOTE — NURSING NOTE
"Chief Complaint   Patient presents with     Prenatal Care       Initial /66 (BP Location: Left arm, Patient Position: Sitting, Cuff Size: Adult Regular)   Pulse 60   Wt 76.7 kg (169 lb)   BMI 31.10 kg/m   Estimated body mass index is 31.1 kg/m  as calculated from the following:    Height as of 10/19/18: 1.57 m (5' 1.81\").    Weight as of this encounter: 76.7 kg (169 lb).  Medication Reconciliation: kayleen Garcia CMA    "

## 2020-01-30 NOTE — PROGRESS NOTES
Feeling tired.  Not as excited about this pregnancy as her last, just wants it over.   Declines NIPT.   Wants ultrasound at 16 weeks because her  will be OOT for work for three months.   Discussed why a later ultrasound provides better information.  Referral to [facility] completed.   No bleeding.   RTC @ 20 weeks.   PERCY Ford, CNM

## 2020-01-31 NOTE — TELEPHONE ENCOUNTER
"Per OV note 1/30/2020: \"Feeling tired.  Not as excited about this pregnancy as her last, just wants it over.   Declines NIPT.   Wants ultrasound at 16 weeks because her  will be OOT for work for three months.   Discussed why a later ultrasound provides better information.  Referral to [facility] completed.   No bleeding.   RTC @ 20 weeks.   Ludy Moyer, APRN, CNM\"    RN notes referrals pended for [facility] for IVF pregnancy and Level 2 ultrasound.    RN called and spoke with patient. Patient wondering what [facility] is and why she needs an ultrasound there.    RN explained [facility] stands for [facility] and because this pregnancy is IVF it is commonly a reason to refer patients to [facility]. [facility] is able to give a better quality and more in depth ultrasound.    Patient states understanding but would still like a call from Ludy regarding why she is referred to [facility] and if she can have the ultrasound done before her  leaves on 2/8/2020.    RN informed patient that Ludy would not be back in clinic until early next week.    Patient verbalized understanding and agreed to plan. Patient is awaiting call from Ludy early next week.      RN routing to Ludy for further advisement.    Shawna Traore RN on 1/31/2020 at 10:54 AM    "

## 2020-01-31 NOTE — TELEPHONE ENCOUNTER
When Jacqueline calls please tell her I sent an order for her to go to the [facility] center for her ultrasound.   They will call her to make the appointment  If they do not call her in the next 1-2 weeks she should call us.   PERCY Ford, CLARITZAM

## 2020-01-31 NOTE — TELEPHONE ENCOUNTER
Patient called clinic back. I relayed message from below. She stated she is confused why it got switched to MFM instead. Please call patient back to clarify what's going on. [phone]

## 2020-02-04 NOTE — TELEPHONE ENCOUNTER
Called and reviewed rationale for MFM scan.   She didn't remember that she had gone there with her previous pregnancy.\Ludy Moyer, PERCY, CNM

## 2020-03-02 ENCOUNTER — TRANSFERRED RECORDS (OUTPATIENT)
Dept: HEALTH INFORMATION MANAGEMENT | Facility: CLINIC | Age: 30
End: 2020-03-02

## 2020-03-05 ENCOUNTER — PRENATAL OFFICE VISIT (OUTPATIENT)
Dept: OBGYN | Facility: OTHER | Age: 30
End: 2020-03-05
Payer: COMMERCIAL

## 2020-03-05 VITALS
WEIGHT: 172.5 LBS | HEART RATE: 64 BPM | SYSTOLIC BLOOD PRESSURE: 102 MMHG | DIASTOLIC BLOOD PRESSURE: 64 MMHG | BODY MASS INDEX: 31.74 KG/M2

## 2020-03-05 DIAGNOSIS — O09.299 HISTORY OF MACROSOMIA IN INFANT IN PRIOR PREGNANCY, CURRENTLY PREGNANT: ICD-10-CM

## 2020-03-05 DIAGNOSIS — O09.812 PREGNANCY RESULTING FROM IN VITRO FERTILIZATION IN SECOND TRIMESTER: Primary | ICD-10-CM

## 2020-03-05 DIAGNOSIS — D58.2: ICD-10-CM

## 2020-03-05 DIAGNOSIS — O34.219 HISTORY OF CESAREAN DELIVERY AFFECTING PREGNANCY: ICD-10-CM

## 2020-03-05 PROCEDURE — 99207 ZZC COMPLICATED OB VISIT: CPT | Performed by: OBSTETRICS & GYNECOLOGY

## 2020-03-05 NOTE — PROGRESS NOTES
Presents for routine  appointment.     No complaints.    No abnormal discharge , no leaking fluid , no contractions , no vaginal bleeding    ROS:   and GI  negative.     Please see Prenatal Vitals and Notes Flowsheet for objective data.    A/P:  29 year old  at 19w5d       ICD-10-CM    1. Pregnancy resulting from in vitro fertilization in second trimester O09.812 Glucose tolerance gest screen 1 hour     Hemoglobin     Treponema Abs w Reflex to RPR and Titer       History of macrosomia and history of .   Leaning towards repeat , discuss next visit.   PPH due to arterial bleeding along hysterostomy at the time of the CS.  History of anemia with last pregnancy, received venofer.  Genetic screening declined.    Discussed ultrasound results - scheduled for follow up and ECHO with M   GCT, hgb and anti-treponema testing  after 24 weeks   Follow up in 4-5 weeks.      Anna Norris MD

## 2020-03-05 NOTE — NURSING NOTE
"Chief Complaint   Patient presents with     Prenatal Care     19w5d       Initial /64 (BP Location: Left arm, Cuff Size: Adult Regular)   Pulse 64   Wt 78.2 kg (172 lb 8 oz)   BMI 31.74 kg/m   Estimated body mass index is 31.74 kg/m  as calculated from the following:    Height as of 10/19/18: 1.57 m (5' 1.81\").    Weight as of this encounter: 78.2 kg (172 lb 8 oz).  BP completed using cuff size: regular        PHQ-9 score:    PHQ-9 SCORE 2018   PHQ-9 Total Score MyChart 3 (Minimal depression)   PHQ-9 Total Score 3       Anna Guerin MA on 3/5/2020 at 4:27 PM    "

## 2020-03-30 ENCOUNTER — TRANSFERRED RECORDS (OUTPATIENT)
Dept: HEALTH INFORMATION MANAGEMENT | Facility: CLINIC | Age: 30
End: 2020-03-30

## 2020-04-16 ENCOUNTER — PRENATAL OFFICE VISIT (OUTPATIENT)
Dept: OBGYN | Facility: CLINIC | Age: 30
End: 2020-04-16
Payer: COMMERCIAL

## 2020-04-16 VITALS
BODY MASS INDEX: 33.97 KG/M2 | HEART RATE: 89 BPM | WEIGHT: 184.6 LBS | DIASTOLIC BLOOD PRESSURE: 71 MMHG | SYSTOLIC BLOOD PRESSURE: 115 MMHG

## 2020-04-16 DIAGNOSIS — O09.812 PREGNANCY RESULTING FROM IN VITRO FERTILIZATION IN SECOND TRIMESTER: ICD-10-CM

## 2020-04-16 LAB
GLUCOSE 1H P 50 G GLC PO SERPL-MCNC: 124 MG/DL (ref 60–129)
HGB BLD-MCNC: 10.8 G/DL (ref 11.7–15.7)

## 2020-04-16 PROCEDURE — 82950 GLUCOSE TEST: CPT | Performed by: OBSTETRICS & GYNECOLOGY

## 2020-04-16 PROCEDURE — 99207 ZZC COMPLICATED OB VISIT: CPT | Performed by: OBSTETRICS & GYNECOLOGY

## 2020-04-16 PROCEDURE — 86780 TREPONEMA PALLIDUM: CPT | Performed by: OBSTETRICS & GYNECOLOGY

## 2020-04-16 PROCEDURE — 85018 HEMOGLOBIN: CPT | Performed by: OBSTETRICS & GYNECOLOGY

## 2020-04-16 PROCEDURE — 36415 COLL VENOUS BLD VENIPUNCTURE: CPT | Performed by: OBSTETRICS & GYNECOLOGY

## 2020-04-16 NOTE — NURSING NOTE
"Chief Complaint   Patient presents with     Prenatal Care     25w5d       Initial /71 (BP Location: Right arm, Cuff Size: Adult Regular)   Pulse 89   Wt 83.7 kg (184 lb 9.6 oz)   BMI 33.97 kg/m   Estimated body mass index is 33.97 kg/m  as calculated from the following:    Height as of 10/19/18: 1.57 m (5' 1.81\").    Weight as of this encounter: 83.7 kg (184 lb 9.6 oz).  BP completed using cuff size: regular        PHQ-9 score:    PHQ-9 SCORE 2018   PHQ-9 Total Score MyChart 3 (Minimal depression)   PHQ-9 Total Score 3         Anna Guerin MA on 2020 at 3:51 PM    "

## 2020-04-16 NOTE — PROGRESS NOTES
Presents for routine  appointment.     No complaints.  She has been eating ice but not as much as last time.  She does not take supplemental iron because it binds her up despite use of stool softeners.  She has a little vaginal itching but does not think it is a yeast infection and prefers to observe.  No abnormal discharge , no leaking fluid , no contractions , no vaginal bleeding    ROS:   and GI  negative.     Please see Prenatal Vitals and Notes Flowsheet for objective data.    A/P:  30 year old  at 25w5d       ICD-10-CM    1. Pregnancy resulting from in vitro fertilization in second trimester  O09.812 Glucose tolerance gest screen 1 hour     Hemoglobin     Treponema Abs w Reflex to RPR and Titer       1 hr glucola today.  She does have access to MyChart.  She is Rh Positive   TDAP  next visit.    History of macrosomia and history of .   Plan repeat. Plan to schedule that next visit.  PPH due to arterial bleeding along hysterostomy at the time of the CS.  History of anemia with last pregnancy, received venofer.  Will contact her with lab results and recommendations. Does not tolerate oral iron well.   MFM US reviewed.  ECHO normal.  Routine care.    Discussed signs and symptoms of  labor  Follow up in 4  weeks.      Anna Norris MD

## 2020-04-17 LAB — T PALLIDUM AB SER QL: NONREACTIVE

## 2020-04-17 NOTE — RESULT ENCOUNTER NOTE
Hi,    Your glucose test is normal.  Your hemoglobin is slightly low for pregnancy.  Please plan to increase the iron in your diet.  Please let me know if you would like a list of iron rich foods.  We will plan to recheck your hemoglobin and ferritin level at your next visit.  We will hold of on iron infusions for now.  Please let me know if you have any questions or concerns.     Thanks!  Dr. Norris

## 2020-05-15 ENCOUNTER — PRENATAL OFFICE VISIT (OUTPATIENT)
Dept: OBGYN | Facility: CLINIC | Age: 30
End: 2020-05-15
Payer: COMMERCIAL

## 2020-05-15 VITALS
DIASTOLIC BLOOD PRESSURE: 66 MMHG | HEART RATE: 80 BPM | SYSTOLIC BLOOD PRESSURE: 114 MMHG | WEIGHT: 188.7 LBS | BODY MASS INDEX: 34.73 KG/M2

## 2020-05-15 DIAGNOSIS — Z23 NEED FOR VACCINATION: ICD-10-CM

## 2020-05-15 DIAGNOSIS — O99.019 ANEMIA AFFECTING PREGNANCY, ANTEPARTUM: Primary | ICD-10-CM

## 2020-05-15 DIAGNOSIS — O09.299 HISTORY OF MACROSOMIA IN INFANT IN PRIOR PREGNANCY, CURRENTLY PREGNANT: ICD-10-CM

## 2020-05-15 DIAGNOSIS — O34.219 HISTORY OF CESAREAN DELIVERY AFFECTING PREGNANCY: ICD-10-CM

## 2020-05-15 DIAGNOSIS — O09.813 PREGNANCY RESULTING FROM IN VITRO FERTILIZATION IN THIRD TRIMESTER: ICD-10-CM

## 2020-05-15 LAB
FERRITIN SERPL-MCNC: 6 NG/ML (ref 12–150)
HGB BLD-MCNC: 11 G/DL (ref 11.7–15.7)

## 2020-05-15 PROCEDURE — 82728 ASSAY OF FERRITIN: CPT | Performed by: OBSTETRICS & GYNECOLOGY

## 2020-05-15 PROCEDURE — 90471 IMMUNIZATION ADMIN: CPT | Performed by: OBSTETRICS & GYNECOLOGY

## 2020-05-15 PROCEDURE — 36415 COLL VENOUS BLD VENIPUNCTURE: CPT | Performed by: OBSTETRICS & GYNECOLOGY

## 2020-05-15 PROCEDURE — 85018 HEMOGLOBIN: CPT | Performed by: OBSTETRICS & GYNECOLOGY

## 2020-05-15 PROCEDURE — 90715 TDAP VACCINE 7 YRS/> IM: CPT | Performed by: OBSTETRICS & GYNECOLOGY

## 2020-05-15 PROCEDURE — 99207 ZZC COMPLICATED OB VISIT: CPT | Performed by: OBSTETRICS & GYNECOLOGY

## 2020-05-15 NOTE — NURSING NOTE
"Chief Complaint   Patient presents with     Prenatal Care     29w6d       Initial /66 (BP Location: Right arm, Cuff Size: Adult Regular)   Pulse 80   Wt 85.6 kg (188 lb 11.2 oz)   BMI 34.73 kg/m   Estimated body mass index is 34.73 kg/m  as calculated from the following:    Height as of 10/19/18: 1.57 m (5' 1.81\").    Weight as of this encounter: 85.6 kg (188 lb 11.2 oz).  BP completed using cuff size: regular              Anna Guerin MA on 5/15/2020 at 3:34 PM    "

## 2020-05-15 NOTE — RESULT ENCOUNTER NOTE
Hi,    Your hemoglobin is improved since last visit.  Your ferritin is low, however.  Generally I don't think you need the IV iron when your hemoglobin is this good.  I know you do not tolerate the oral iron, but maybe you can try to get more iron in your diet to help with the ferritin level.  We can discuss this more at your next visit, but let me know if you have concerns before then.      Thanks!  Dr. Norris

## 2020-05-15 NOTE — NURSING NOTE
Prior to immunization administration, verified patients identity using patient s name and date of birth. Please see Immunization Activity for additional information.     Screening Questionnaire for Adult Immunization    Are you sick today?   No   Do you have allergies to medications, food, a vaccine component or latex?   No   Have you ever had a serious reaction after receiving a vaccination?   No   Do you have a long-term health problem with heart, lung, kidney, or metabolic disease (e.g., diabetes), asthma, a blood disorder, no spleen, complement component deficiency, a cochlear implant, or a spinal fluid leak?  Are you on long-term aspirin therapy?   No   Do you have cancer, leukemia, HIV/AIDS, or any other immune system problem?   No   Do you have a parent, brother, or sister with an immune system problem?   No   In the past 3 months, have you taken medications that affect  your immune system, such as prednisone, other steroids, or anticancer drugs; drugs for the treatment of rheumatoid arthritis, Crohn s disease, or psoriasis; or have you had radiation treatments?   No   Have you had a seizure, or a brain or other nervous system problem?   No   During the past year, have you received a transfusion of blood or blood    products, or been given immune (gamma) globulin or antiviral drug?   No   For women: Are you pregnant or is there a chance you could become       pregnant during the next month?   Yes   Have you received any vaccinations in the past 4 weeks?   No     Immunization questionnaire was positive for at least one answer.  Notified .        Per orders of Dr. Norris, injection of Tdap (Adacel) given by Anna Guerin MA. Patient instructed to remain in clinic for 15 minutes afterwards, and to report any adverse reaction to me immediately.       Screening performed by Anna Guerin MA on 5/15/2020 at 3:39 PM.

## 2020-05-15 NOTE — PROGRESS NOTES
Presents for routine  appointment.     No complaints aside from some BH  No abnormal discharge , no leaking fluid , no contractions , no vaginal bleeding    ROS:   and GI  negative.     Please see Prenatal Vitals and Notes Flowsheet for objective data.  Hemoglobin   Date Value Ref Range Status   05/15/2020 11.0 (L) 11.7 - 15.7 g/dL Final     Lab Results   Component Value Date    ABO AB 2020    RH Pos 2020       A/P:  30 year old  at 29w6d       ICD-10-CM    1. Anemia affecting pregnancy, antepartum  O99.019 Hemoglobin     Ferritin   2. Need for vaccination  Z23 TDAP VACCINE (ADACEL) [82418.002]     1st  Administration  [21478]   3. History of  delivery affecting pregnancy  O34.219 Ramila-Operative Worksheet  Section   4. Pregnancy resulting from in vitro fertilization in third trimester  O09.813    5. History of macrosomia in infant in prior pregnancy, currently pregnant  O09.299        GCT Normal  History of macrosomia and history of .   Plan repeat. Estimated Date of Delivery: 2020.  39 weeks Saturday the .  Will try for Monday the , then try for the .   PPH due to arterial bleeding along hysterostomy at the time of the CS.  History of anemia with last pregnancy, received venofer.  TDAP today.    Rhogam: not needed  Discussed kick counts   Follow up in 2 weeks.      Anna Norris MD

## 2020-05-19 ENCOUNTER — TELEPHONE (OUTPATIENT)
Dept: OBGYN | Facility: OTHER | Age: 30
End: 2020-05-19

## 2020-05-19 NOTE — TELEPHONE ENCOUNTER
Surgery Pre-Certification    Medical Record Number: [id number]  Jacqueline Bolden  YOB: 1990   Phone: [phone] (home)   Primary Provider: No Ref-Primary, Physician    Reason for Admit:  Previous  Section     Surgeon: MEI Norris MD  Surgical Procedure: repeat  section   ICD-9 Coded: 034.219  Date of Surgery: 2020  Consent signed? N/A      Hospital: [facility]  Inpatient- Length of stay:  3 days.    Requestor:  Julianna Castaneda     Location:     Surgery Scheduled    Date of Surgery 2020 Time of Surgery 2:30 pm  Type of Anesthesia Anticipated: Spinal  Pre-Op: Dr. Norris  Post-Op:  6 weeks with Dr. Norris  Pre-certification routed to Financial Counselors:  Yes    Surgery packet mailed to patient's home address: Yes  Patient instructed NPO 12 hours prior to surgery, arrive 1.5 hours prior to surgery, must have a     Procedure name(s) - multi select   Delivery     Reason for procedure  History of      Is this a multi surgeon case?  No     Laterality  N/A     Request for additional equipment  Other (see comments)  None    Anesthesia  Spinal     Positioning (if different from preference card):  Supine     Is the patient known to have any of the following?  None of the above     Initiate Pre-op orders for above procedure:  Yes, as ordered in Epic  additional orders noted there also    Location of Case:  [facility]     Sterilization or Hysterectomy consent signed in advance:  NA     Urgency of Surgery:  Routine     Surgeon Procedure Time (incision to closure) in minutes (per procedure as applicable)  60     Note:  Surgical Case Time Needed (in minutes)    Surgery Date:  39-40 weeks  Prefers Monday the 20th    Patient Class (for admit prior to surgery, specify number of days in comments):  Surgery admit  admit day of surgery    H&P To Be Completed By:  Surgeon      needed?  No     Post-Op Appointment  6 weeks     Vendor  Needed?  No

## 2020-05-21 NOTE — TELEPHONE ENCOUNTER
PB DOS: 2020  Type of Procedure: repeat  section   CPT Codes: 16197  ICD10 Codes: 034.219  Surgeon/Ordering provider: Dr. Norris  Pre-cert/Authorization completed:  Yes, No PA Required  Payer: [facility] [place]  Date Checked: 2020  Spoke to Av[employer]ity  Ref. # NA/ Auth # NA  Valid Dates: NA

## 2020-05-28 ENCOUNTER — VIRTUAL VISIT (OUTPATIENT)
Dept: OBGYN | Facility: CLINIC | Age: 30
End: 2020-05-28
Payer: COMMERCIAL

## 2020-05-28 DIAGNOSIS — O34.219 HISTORY OF CESAREAN DELIVERY AFFECTING PREGNANCY: Primary | ICD-10-CM

## 2020-05-28 DIAGNOSIS — O99.019 ANEMIA AFFECTING PREGNANCY, ANTEPARTUM: ICD-10-CM

## 2020-05-28 DIAGNOSIS — O09.813 PREGNANCY RESULTING FROM IN VITRO FERTILIZATION IN THIRD TRIMESTER: ICD-10-CM

## 2020-05-28 DIAGNOSIS — O09.299 HISTORY OF MACROSOMIA IN INFANT IN PRIOR PREGNANCY, CURRENTLY PREGNANT: ICD-10-CM

## 2020-05-28 PROCEDURE — 99207 ZZC COMPLICATED OB VISIT: CPT | Mod: TEL | Performed by: OBSTETRICS & GYNECOLOGY

## 2020-05-28 NOTE — PROGRESS NOTES
"Jacqueline Bolden is a 30 year old female who is being evaluated via a billable telephone visit.      The patient has been notified of following:     \"This telephone visit will be conducted via a call between you and your physician/provider. We have found that certain health care needs can be provided without the need for a physical exam.  This service lets us provide the care you need with a short phone conversation.  If a prescription is necessary we can send it directly to your pharmacy.  If lab work is needed we can place an order for that and you can then stop by our lab to have the test done at a later time.    Telephone visits are billed at different rates depending on your insurance coverage. During this emergency period, for some insurers they may be billed the same as an in-person visit.  Please reach out to your insurance provider with any questions.    If during the course of the call the physician/provider feels a telephone visit is not appropriate, you will not be charged for this service.\"    Patient has given verbal consent for Telephone visit?  Yes    What phone number would you like to be contacted at? [phone]    How would you like to obtain your AVS? Volodymyr       Presents for routine  appointment.     No complaints, but still eating ice, not as bad as last pregnancy.    No abnormal discharge , no leaking fluid , no contractions aside from some BH, no vaginal bleeding  Has an off-color to the discharge.   ROS:   and GI  negative.     Please see Prenatal Vitals and Notes Flowsheet for objective data.  Hemoglobin   Date Value Ref Range Status   05/15/2020 11.0 (L) 11.7 - 15.7 g/dL Final   Ferritin 6    A/P:  30 year old  at 31w5d       ICD-10-CM    1. History of  delivery affecting pregnancy  O34.219    2. Pregnancy resulting from in vitro fertilization in third trimester  O09.813    3. Anemia affecting pregnancy, antepartum  O99.019    4. History of macrosomia in " infant in prior pregnancy, currently pregnant  O09.299        Tdap given at previous visit    Anemia - improved.  Does not tolerate oral iron.  Has required IV iron in the past. Repeat labs at her next visit.   Repeat CS scheduled 7/21/2020 at 230 pm  PPH due to arterial bleeding along hysterostomy at the time of the CS.  Come in for exam if she develops concerns about discharge, otherwise we will see her in 2 weeks.  Follow up in 2 weeks.  We will get her the information regarding her surgery if she has not received it in the mail by then.      Anna Norris MD          Phone call duration: 15 minutes

## 2020-06-15 ENCOUNTER — PRENATAL OFFICE VISIT (OUTPATIENT)
Dept: OBGYN | Facility: CLINIC | Age: 30
End: 2020-06-15
Payer: COMMERCIAL

## 2020-06-15 VITALS
BODY MASS INDEX: 35.61 KG/M2 | HEART RATE: 84 BPM | SYSTOLIC BLOOD PRESSURE: 120 MMHG | DIASTOLIC BLOOD PRESSURE: 72 MMHG | WEIGHT: 193.5 LBS

## 2020-06-15 DIAGNOSIS — O34.219 HISTORY OF CESAREAN DELIVERY AFFECTING PREGNANCY: Primary | ICD-10-CM

## 2020-06-15 DIAGNOSIS — O99.019 ANEMIA AFFECTING PREGNANCY, ANTEPARTUM: ICD-10-CM

## 2020-06-15 DIAGNOSIS — O09.299 HISTORY OF MACROSOMIA IN INFANT IN PRIOR PREGNANCY, CURRENTLY PREGNANT: ICD-10-CM

## 2020-06-15 DIAGNOSIS — O09.813 PREGNANCY RESULTING FROM IN VITRO FERTILIZATION IN THIRD TRIMESTER: ICD-10-CM

## 2020-06-15 PROCEDURE — 99207 ZZC COMPLICATED OB VISIT: CPT | Performed by: OBSTETRICS & GYNECOLOGY

## 2020-06-15 NOTE — PROGRESS NOTES
Presents for routine  appointment.     No complaints aside from BH.    No abnormal discharge , no leaking fluid , no contractions , no vaginal bleeding    ROS:   and GI  negative.     Please see Prenatal Vitals and Notes Flowsheet for objective data.  Hemoglobin   Date Value Ref Range Status   05/15/2020 11.0 (L) 11.7 - 15.7 g/dL Final   ]     A/P:  30 year old  at 34w2d       ICD-10-CM    1. History of  delivery affecting pregnancy  O34.219    2. Pregnancy resulting from in vitro fertilization in third trimester  O09.813    3. Anemia affecting pregnancy, antepartum  O99.019    4. History of macrosomia in infant in prior pregnancy, currently pregnant  O09.299 US OB >14 Weeks Follow Up       Reportable signs and symptoms discussed  Group B Strep at 36+ weeks   Repeat CS scheduled 2020 at 230 pm  PPH due to arterial bleeding along hysterostomy at the time of the CS.  Plan US for growth.  Baby is measuring a little large and history of macrosomia.   Follow up in 2 weeks.  Preop then      Anna Norris MD

## 2020-06-16 ENCOUNTER — MYC MEDICAL ADVICE (OUTPATIENT)
Dept: OBGYN | Facility: CLINIC | Age: 30
End: 2020-06-16

## 2020-06-19 ENCOUNTER — ANCILLARY PROCEDURE (OUTPATIENT)
Dept: ULTRASOUND IMAGING | Facility: CLINIC | Age: 30
End: 2020-06-19
Attending: OBSTETRICS & GYNECOLOGY
Payer: COMMERCIAL

## 2020-06-19 ENCOUNTER — TELEPHONE (OUTPATIENT)
Dept: OBGYN | Facility: CLINIC | Age: 30
End: 2020-06-19

## 2020-06-19 DIAGNOSIS — O09.299 HISTORY OF MACROSOMIA IN INFANT IN PRIOR PREGNANCY, CURRENTLY PREGNANT: ICD-10-CM

## 2020-06-19 PROCEDURE — 76816 OB US FOLLOW-UP PER FETUS: CPT | Performed by: RADIOLOGY

## 2020-06-25 NOTE — PROGRESS NOTES
2020     NAME:  Jacqueline Bolden  PCP:  No Ref-Primary, Physician  MRN:  [id number]    PREOPERATIVE EXAM    Impression / Plan     Assessment:     30 year old  at 36w2d with history of .      Body mass index is 35.55 kg/m .     Patient is at  intermediate risk for the proposed surgery    Femur length 4% at 34-35 wks.  EFW 40%.  AC 75%.  consider US at 37-38 wks.     Anemia in pregnancy    IVF pregnancy      Plan:      Repeat  section     We discussed the plans for  section. She is aware of the risks, benefits, and alternatives to  delivery.  Risks include but are not limited to bleeding, infections which may require antibiotic therapy, injury maternal organs (to include but not limited to blood vessels, nerves, muscle, skin, bladder, ureters, bowel, uterus, tubes, ovaries), as well as risk of injury to the fetus.  If bleeding is excessive it might require transfusion or rarely a hysterectomy.  She acknowledged understanding of the risks      Medications are to be stopped before surgery.     She will see one of my partners the week of the  while I am out of the office plan US for growth at that time to check femur length.      She is cleared for surgery    HPI     Jacqueline Bolden is a  30 year old who is seen for routine OB care.    Patient has no concerns today aside from BH.  She denies contractions, leaking of amniotic fluid, and vaginal bleeding.        1. NO - Do you have a history of heart attack, stroke, stent, bypass or surgery on an artery in the head, neck, heart or legs?  2. NO - Do you ever have any pain or discomfort in your chest?  3. NO - Do you have a history of  Heart Failure?  4. NO - Are you troubled by shortness of breath when: walking on the level, up a slight hill or at night?  5. NO - Do you currently have a cold, bronchitis or other respiratory infection?  6. NO - Do you have a cough, shortness of breath or wheezing?  7. NO - Do you  sometimes get pains in the calves of your legs when you walk?  8. NO - Do you or anyone in your family have previous history of blood clots?  9. NO - Do you or does anyone in your family have a serious bleeding problem such as prolonged bleeding following surgeries or cuts?  10. Yes  - Have you ever had problems with anemia or been told to take iron pills?  11. NO - Have you had any abnormal blood loss such as black, tarry or bloody stools, or abnormal vaginal bleeding?  12. NO - Have you ever had a blood transfusion?  13. NO - Have you or any of your relatives ever had problems with anesthesia?  14. NO - Do you have sleep apnea, excessive snoring or daytime drowsiness?  15. NO - Do you have any prosthetic heart valves?  16. NO - Do you have prosthetic joints?  17. Yes - Is there any chance that you may be pregnant?        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           Allergies     Allergies   Allergen Reactions     No Known Drug Allergies        Medical / Surgical History     Past Medical History:   Diagnosis Date     Dysmenorrhea        Past Surgical History:   Procedure Laterality Date      SECTION  10/25/2018       Social History     Social History     Socioeconomic History     Marital status:      Spouse name: Not on file     Number of children: Not on file     Years of education: Not on file     Highest education level: Not on file   Occupational History     Occupation: student      Employer: 0STUDENT     Comment: [employer]     Occupation: [employer]     Employer: [employer]UDENT   Social Needs     Financial resource strain: Not on file     Food insecurity     Worry: Not on file     Inability: Not on file     Transportation needs     Medical: Not on file     Non-medical: Not on file   Tobacco Use     Smoking status: Former Smoker     Packs/day: 1.00     Types: Cigarettes     Last attempt to quit: 3/5/2013     Years since quittin.3     Smokeless tobacco: Never Used     Tobacco comment:  1 pack every 5 days   Substance and Sexual Activity     Alcohol use: Yes     Comment: OCC.     Drug use: No     Sexual activity: Yes     Partners: Male     Birth control/protection: None   Lifestyle     Physical activity     Days per week: Not on file     Minutes per session: Not on file     Stress: Not on file   Relationships     Social connections     Talks on phone: Not on file     Gets together: Not on file     Attends Pentec[employer]ism service: Not on file     Active member of club or organization: Not on file     Attends meetings of clubs or organizations: Not on file     Relationship status: Not on file     Intimate partner violence     Fear of current or ex partner: Not on file     Emotionally abused: Not on file     Physically abused: Not on file     Forced sexual activity: Not on file   Other Topics Concern     Parent/sibling w/ CABG, MI or angioplasty before 65F 55M? Not Asked   Social History Narrative    Lives with housemates. No domestic violence issues.       Family History     Family History   Problem Relation Age of Onset     Family History Negative No family hx of        ROS     A 10 organ review of systems was asked and the pertinent positives and negatives are listed in the HPI. All other organ systems can be considered negative.     Physical Exam   Vitals: /62 (BP Location: Right arm, Cuff Size: Adult Regular)   Pulse 82   Wt 87.6 kg (193 lb 3.2 oz)   BMI 35.55  kg/m      General: Well developed, well nourished, pleasant. No acute distress.    Neck: Trachea midline. Supple, no palpable masses.  No thyromegaly.  CV: Regular rhythm, normal rate. No murmurs auscultated  Resp: Nonlabored. Clear to auscultation bilaterally, no wheezes.   Abdomen: Soft, non tender, gravid  Skin: No rashes, lesions, or subcutaneous nodules.   : closed cervix.    Neuro:  Alert and oriented times 3.  Appropriate.    See obstetrical flowsheet for additional findings.     Labs/Imaging       Labs were reviewed in Epic    Lab Results   Component Value Date    GBS Negative 09/20/2018     Hemoglobin   Date Value Ref Range Status   05/15/2020 11.0 (L) 11.7 - 15.7 g/dL Final   ]     Imaging was reviewed in Epic.   Posterior placenta      20 minutes was spent with the patient, more than 50% spent counseling and coordinating care.      Nursing notes read and reviewed    Anna Norris MD

## 2020-06-29 ENCOUNTER — PRENATAL OFFICE VISIT (OUTPATIENT)
Dept: OBGYN | Facility: CLINIC | Age: 30
End: 2020-06-29
Payer: COMMERCIAL

## 2020-06-29 VITALS
DIASTOLIC BLOOD PRESSURE: 62 MMHG | WEIGHT: 193.2 LBS | SYSTOLIC BLOOD PRESSURE: 100 MMHG | HEART RATE: 82 BPM | BODY MASS INDEX: 35.55 KG/M2

## 2020-06-29 DIAGNOSIS — O35.HXX0 SHORT FEMUR OF FETUS ON PRENATAL ULTRASOUND: ICD-10-CM

## 2020-06-29 DIAGNOSIS — O09.813 PREGNANCY RESULTING FROM IN VITRO FERTILIZATION IN THIRD TRIMESTER: ICD-10-CM

## 2020-06-29 DIAGNOSIS — O34.219 HISTORY OF CESAREAN DELIVERY AFFECTING PREGNANCY: Primary | ICD-10-CM

## 2020-06-29 DIAGNOSIS — O99.019 ANEMIA AFFECTING PREGNANCY, ANTEPARTUM: ICD-10-CM

## 2020-06-29 LAB — HGB BLD-MCNC: 10.6 G/DL (ref 11.7–15.7)

## 2020-06-29 PROCEDURE — 82728 ASSAY OF FERRITIN: CPT | Performed by: OBSTETRICS & GYNECOLOGY

## 2020-06-29 PROCEDURE — 87653 STREP B DNA AMP PROBE: CPT | Performed by: OBSTETRICS & GYNECOLOGY

## 2020-06-29 PROCEDURE — 85018 HEMOGLOBIN: CPT | Performed by: OBSTETRICS & GYNECOLOGY

## 2020-06-29 PROCEDURE — 99207 ZZC COMPLICATED OB VISIT: CPT | Performed by: OBSTETRICS & GYNECOLOGY

## 2020-06-29 PROCEDURE — 36415 COLL VENOUS BLD VENIPUNCTURE: CPT | Performed by: OBSTETRICS & GYNECOLOGY

## 2020-06-29 NOTE — NURSING NOTE
"Chief Complaint   Patient presents with     Prenatal Care     36w2d       Initial /62 (BP Location: Right arm, Cuff Size: Adult Regular)   Pulse 82   Wt 87.6 kg (193 lb 3.2 oz)   BMI 35.55 kg/m   Estimated body mass index is 35.55 kg/m  as calculated from the following:    Height as of 10/19/18: 1.57 m (5' 1.81\").    Weight as of this encounter: 87.6 kg (193 lb 3.2 oz).  BP completed using cuff size: regular          Anna Guerin MA on 2020 at 2:57 PM      "

## 2020-06-30 LAB
FERRITIN SERPL-MCNC: 5 NG/ML (ref 12–150)
GP B STREP DNA SPEC QL NAA+PROBE: NEGATIVE
SPECIMEN SOURCE: NORMAL

## 2020-07-06 ENCOUNTER — PRENATAL OFFICE VISIT (OUTPATIENT)
Dept: OBGYN | Facility: CLINIC | Age: 30
End: 2020-07-06
Payer: COMMERCIAL

## 2020-07-06 VITALS
SYSTOLIC BLOOD PRESSURE: 112 MMHG | WEIGHT: 196.1 LBS | HEART RATE: 88 BPM | DIASTOLIC BLOOD PRESSURE: 72 MMHG | BODY MASS INDEX: 36.09 KG/M2

## 2020-07-06 DIAGNOSIS — O09.299 HISTORY OF MACROSOMIA IN INFANT IN PRIOR PREGNANCY, CURRENTLY PREGNANT: ICD-10-CM

## 2020-07-06 DIAGNOSIS — O34.219 HISTORY OF CESAREAN DELIVERY AFFECTING PREGNANCY: ICD-10-CM

## 2020-07-06 DIAGNOSIS — B37.31 YEAST INFECTION OF THE VAGINA: ICD-10-CM

## 2020-07-06 DIAGNOSIS — O09.813 PREGNANCY RESULTING FROM IN VITRO FERTILIZATION IN THIRD TRIMESTER: ICD-10-CM

## 2020-07-06 DIAGNOSIS — O35.HXX0 SHORT FEMUR OF FETUS ON PRENATAL ULTRASOUND: Primary | ICD-10-CM

## 2020-07-06 DIAGNOSIS — O99.019 ANEMIA AFFECTING PREGNANCY, ANTEPARTUM: ICD-10-CM

## 2020-07-06 PROCEDURE — 99207 ZZC COMPLICATED OB VISIT: CPT | Performed by: OBSTETRICS & GYNECOLOGY

## 2020-07-06 NOTE — PROGRESS NOTES
Presents for routine  appointment.    Vaginal itching and burning over the weekend.  This has resolved.    No leaking fluid , no contractions aside from BH, no vaginal bleeding    ROS:   and GI  negative.     Please see Prenatal Vitals and Notes Flowsheet for objective data.    Speculum exam reveals white clumpy discharge adherent to the vaginal walls.  Cervix appears closed.      Lab Results   Component Value Date    GBS Negative 2020       A/P:  30 year old  at 37w2d       ICD-10-CM    1. Short femur of fetus on prenatal ultrasound  O35.8XX0    2. History of  delivery affecting pregnancy  O34.219    3. Pregnancy resulting from in vitro fertilization in third trimester  O09.813    4. Anemia affecting pregnancy, antepartum  O99.019    5. History of macrosomia in infant in prior pregnancy, currently pregnant  O09.299        Labor precautions given   Femur length 4% at 34-35 wks.  EFW 40%.  AC 75%.  US scheduled for growth for next visit.   Repeat CS scheduled 2020 at 230 pm  Preop done last visit  History of PPH due to arterial bleeding along hysterostomy at the time of the CS.  Follow up in 1 week.      Anna Norris MD

## 2020-07-06 NOTE — NURSING NOTE
"Chief Complaint   Patient presents with     Prenatal Care     37w2d       Initial /72 (BP Location: Right arm, Cuff Size: Adult Regular)   Pulse 88   Wt 89 kg (196 lb 1.6 oz)   BMI 36.09 kg/m   Estimated body mass index is 36.09 kg/m  as calculated from the following:    Height as of 10/19/18: 1.57 m (5' 1.81\").    Weight as of this encounter: 89 kg (196 lb 1.6 oz).  BP completed using cuff size: regular        Anna Guerin MA on 2020 at 4:37 PM        "

## 2020-07-07 DIAGNOSIS — O34.219 HISTORY OF CESAREAN DELIVERY AFFECTING PREGNANCY: Primary | ICD-10-CM

## 2020-07-13 ENCOUNTER — PRENATAL OFFICE VISIT (OUTPATIENT)
Dept: OBGYN | Facility: CLINIC | Age: 30
End: 2020-07-13
Payer: COMMERCIAL

## 2020-07-13 ENCOUNTER — ANCILLARY PROCEDURE (OUTPATIENT)
Dept: ULTRASOUND IMAGING | Facility: CLINIC | Age: 30
End: 2020-07-13
Attending: OBSTETRICS & GYNECOLOGY
Payer: COMMERCIAL

## 2020-07-13 VITALS
OXYGEN SATURATION: 98 % | BODY MASS INDEX: 36.31 KG/M2 | HEART RATE: 93 BPM | DIASTOLIC BLOOD PRESSURE: 71 MMHG | WEIGHT: 197.3 LBS | SYSTOLIC BLOOD PRESSURE: 118 MMHG

## 2020-07-13 DIAGNOSIS — O34.219 HISTORY OF CESAREAN DELIVERY AFFECTING PREGNANCY: ICD-10-CM

## 2020-07-13 DIAGNOSIS — O99.019 ANEMIA AFFECTING PREGNANCY, ANTEPARTUM: ICD-10-CM

## 2020-07-13 DIAGNOSIS — O35.HXX0 SHORT FEMUR OF FETUS ON PRENATAL ULTRASOUND: ICD-10-CM

## 2020-07-13 DIAGNOSIS — O09.813 PREGNANCY RESULTING FROM IN VITRO FERTILIZATION IN THIRD TRIMESTER: Primary | ICD-10-CM

## 2020-07-13 PROCEDURE — 99207 ZZC COMPLICATED OB VISIT: CPT | Performed by: OBSTETRICS & GYNECOLOGY

## 2020-07-13 PROCEDURE — 76816 OB US FOLLOW-UP PER FETUS: CPT | Performed by: STUDENT IN AN ORGANIZED HEALTH CARE EDUCATION/TRAINING PROGRAM

## 2020-07-13 NOTE — PATIENT INSTRUCTIONS
What to watch out for are: regular contractions every 5 min, vaginal bleeding, decreased fetal movement, or leakage of fluid.  Please call the office or go to L&D if you develop any of these signs and symptoms.      I will see you for your follow up appointment.  Please feel free to call if you have any questions or concerns.      Thanks,  Ana Lilia Danielle, DO

## 2020-07-13 NOTE — PROGRESS NOTES
30 year old  at 38w2d weeks presents to the clinic for a routine prenatal visit.  No concerns.  Complains of feeling more pressure.  No vaginal bleeding, leakage of fluid, or contractions   Fundal height=39cm  FHTs=160's  CX=/-3  Discussed labor precautions  RTC 1 week  Last ultrasound showed femur length to be the 4%.  Repeat ultrasound today  RCS=  GBS=Negative    Ana Lilia Danielle DO

## 2020-07-18 DIAGNOSIS — O34.219 HISTORY OF CESAREAN DELIVERY AFFECTING PREGNANCY: ICD-10-CM

## 2020-07-18 PROCEDURE — U0003 INFECTIOUS AGENT DETECTION BY NUCLEIC ACID (DNA OR RNA); SEVERE ACUTE RESPIRATORY SYNDROME CORONAVIRUS 2 (SARS-COV-2) (CORONAVIRUS DISEASE [COVID-19]), AMPLIFIED PROBE TECHNIQUE, MAKING USE OF HIGH THROUGHPUT TECHNOLOGIES AS DESCRIBED BY CMS-2020-01-R: HCPCS | Performed by: OBSTETRICS & GYNECOLOGY

## 2020-07-18 NOTE — LETTER
July 22, 2020        Jacqueline Bolden  [address]  [place] [address]    This letter provides a written record that you were tested for COVID-19 on 7/18/2020.       Your result was negative. This means that we didn t find the virus that causes COVID-19 in your sample. A test may show negative when you do actually have the virus. This can happen when the virus is in the early stages of infection, before you feel illness symptoms.    If you have symptoms   Stay home and away from others (self-isolate) until you meet ALL of the guidelines below:    You ve had no fever--and no medicine that reduces fever--for 3 full days (72 hours). And      Your other symptoms have gotten better. For example, your cough or breathing has improved. And     At least 10 days have passed since your symptoms started.    During this time:    Stay home. Don t go to work, school or anywhere else.     Stay in your own room, including for meals. Use your own bathroom if you can.    Stay away from others in your home. No hugging, kissing or shaking hands. No visitors.    Clean  high touch  surfaces often (doorknobs, counters, handles, etc.). Use a household cleaning spray or wipes. You can find a full list on the EPA website at www.epa.gov/pesticide-registration/list-n-disinfectants-use-against-sars-cov-2.    Cover your mouth and nose with a mask, tissue or washcloth to avoid spreading germs.    Wash your hands and face often with soap and water.    Going back to work  Check with your employer for any guidelines to follow for going back to work.    Employers: This document serves as formal notice that your employee tested negative for COVID-19, as of the testing date shown above.

## 2020-07-19 LAB
SARS-COV-2 RNA SPEC QL NAA+PROBE: NOT DETECTED
SPECIMEN SOURCE: NORMAL

## 2020-07-21 ENCOUNTER — TRANSFERRED RECORDS (OUTPATIENT)
Dept: HEALTH INFORMATION MANAGEMENT | Facility: CLINIC | Age: 30
End: 2020-07-21

## 2020-08-15 ENCOUNTER — MEDICAL CORRESPONDENCE (OUTPATIENT)
Dept: HEALTH INFORMATION MANAGEMENT | Facility: CLINIC | Age: 30
End: 2020-08-15

## 2020-10-11 ENCOUNTER — VIRTUAL VISIT (OUTPATIENT)
Dept: FAMILY MEDICINE | Facility: OTHER | Age: 30
End: 2020-10-11
Payer: COMMERCIAL

## 2020-10-11 PROCEDURE — 99421 OL DIG E/M SVC 5-10 MIN: CPT | Performed by: PHYSICIAN ASSISTANT

## 2020-10-11 NOTE — PROGRESS NOTES
"Date: 10/11/2020 12:04:38  Clinician: Ana Giron  Clinician NPI: [id number]  Patient: Jacqueline Bolden  Patient : 1990  Patient Address: [address] [place] [address] [place] [address]  Patient Phone: [phone]  Visit Protocol: URI  Patient Summary:  Jacqueline is a 30 year old ( : 1990 ) female who initiated a [facility] Visit for COVID-19 (Coronavirus) evaluation and screening. When asked the question \"Please sign me up to receive news, health information and promotions from [facility].\", Jacqueline responded \"No\".    Jacqueline states her symptoms started suddenly 5-6 days ago.   Her symptoms consist of nasal congestion, anosmia, facial pain or pressure, chills, malaise, and ageusia.   Symptom details     Nasal secretions: The color of her mucus is green.    Facial pain or pressure: The facial pain or pressure does not feel worse when bending or leaning forward.      Jacqueline denies having ear pain, headache, wheezing, fever, enlarged lymph nodes, cough, vomiting, rhinitis, nausea, myalgias, sore throat, teeth pain, and diarrhea. She also denies double sickening (worsening symptoms after initial improvement), having a sinus infection within the past year, taking antibiotic medication in the past month, and having recent facial or sinus surgery in the past 60 days. She is not experiencing dyspnea.   Precipitating events  She has not recently been exposed to someone with influenza. Jacqueline has been in close contact with the following high risk individuals: people with asthma, heart disease or diabetes and children under the age of 5.   Pertinent COVID-19 (Coronavirus) information  In the past 14 days, Jacqueline has not worked in a congregate living setting.   She does not work or volunteer as healthcare worker or a  and does not work or volunteer in a healthcare facility.   Jacqueline also has not lived in a congregate living setting in the past 14 days. She does not live with a healthcare worker.   " Jacqueline has had a close contact with a laboratory-confirmed COVID-19 patient within 14 days of symptom onset. Additional information about contact with COVID-19 (Coronavirus) patient as reported by the patient (free text): My  tested on Thursday and we received the positive result today. I would like to get tested. I also have a 2 year old and 2 month old. Can I still breast feed?   Since December 2019, Jacqueline and has not had upper respiratory infection or influenza-like illness. Has not been diagnosed with lab-confirmed COVID-19 test   Pertinent medical history  Jacqueline does not get yeast infections when she takes antibiotics.   Jacqueline does not need a return to work/school note.   Weight: 155 lbs   Jacqueline does not smoke or use smokeless tobacco.   She denies pregnancy and is breastfeeding. She does not menstruate.   Weight: 155 lbs    MEDICATIONS: No current medications, ALLERGIES: NKDA  Clinician Response:  Dear Jacqueline,   Your symptoms show that you may have coronavirus (COVID-19). This illness can cause fever, cough and trouble breathing. Many people get a mild case and get better on their own. Some people can get very sick.  What should I do?  We would like to test you for this virus.   1. Please call [phone] to schedule your visit. Explain that you were referred by [facility] to have a COVID-19 test. Be ready to share your [facility] visit ID number.  The following will serve as your written order for this COVID Test, ordered by me, for the indication of suspected COVID [Z20.828]: The test will be ordered in [employer], our electronic health record, after you are scheduled. It will show as ordered and authorized by Tay Garza MD.  Order: COVID-19 (Coronavirus) PCR for SYMPTOMATIC testing from [facility].      2. When it's time for your COVID test:  Stay at least 6 feet away from others. (If someone will drive you to your test, stay in the backseat, as far away from the  as you can.)   Cover your mouth and  "nose with a mask, tissue or washcloth.  Go straight to the testing site. Don't make any stops on the way there or back.      3.Starting now: Stay home and away from others (self-isolate) until:   You've had no fever---and no medicine that reduces fever---for one full day (24 hours). And...   Your other symptoms have gotten better. For example, your cough or breathing has improved. And...   At least 10 days have passed since your symptoms started.       During this time, don't leave the house except for testing or medical care.   Stay in your own room, even for meals. Use your own bathroom if you can.   Stay away from others in your home. No hugging, kissing or shaking hands. No visitors.  Don't go to work, school or anywhere else.    Clean \"high touch\" surfaces often (doorknobs, counters, handles, etc.). Use a household cleaning spray or wipes. You'll find a full list of  on the EPA website: www.epa.gov/pesticide-registration/list-n-disinfectants-use-against-sars-cov-2.   Cover your mouth and nose with a mask, tissue or washcloth to avoid spreading germs.  Wash your hands and face often. Use soap and water.  Caregivers in these groups are at risk for severe illness due to COVID-19:  o People 65 years and older  o People who live in a nursing home or long-term care facility  o People with chronic disease (lung, heart, cancer, diabetes, kidney, liver, immunologic)  o People who have a weakened immune system, including those who:   Are in cancer treatment  Take medicine that weakens the immune system, such as corticosteroids  Had a bone marrow or organ transplant  Have an immune deficiency  Have poorly controlled HIV or AIDS  Are obese (body mass index of 40 or higher)  Smoke regularly   o Caregivers should wear gloves while washing dishes, handling laundry and cleaning bedrooms and bathrooms.  o Use caution when washing and drying laundry: Don't shake dirty laundry, and use the warmest water setting that you " can.  o For more tips, go to www.cdc.gov/coronavirus/2019-ncov/downloads/10Things.pdf.    4.Sign up for [employer]. We know it's scary to hear that you might have COVID-19. We want to track your symptoms to make sure you're okay over the next 2 weeks. Please look for an email from [employer]---this is a free, online program that we'll use to keep in touch. To sign up, follow the link in the email. Learn more at [email or web address]  How can I take care of myself?   Get lots of rest. Drink extra fluids (unless a doctor has told you not to).   Take Tylenol (acetaminophen) for fever or pain. If you have liver or kidney problems, ask your family doctor if it's okay to take Tylenol.   Adults can take either:    650 mg (two 325 mg pills) every 4 to 6 hours, or...   1,000 mg (two 500 mg pills) every 8 hours as needed.    Note: Don't take more than 3,000 mg in one day. Acetaminophen is found in many medicines (both prescribed and over-the-counter medicines). Read all labels to be sure you don't take too much.   For children, check the Tylenol bottle for the right dose. The dose is based on the child's age or weight.    If you have other health problems (like cancer, heart failure, an organ transplant or severe kidney disease): Call your specialty clinic if you don't feel better in the next 2 days.       Know when to call 911. Emergency warning signs include:    Trouble breathing or shortness of breath Pain or pressure in the chest that doesn't go away Feeling confused like you haven't felt before, or not being able to wake up Bluish-colored lips or face.  Where can I get more information?    [facility] [facility] -- About COVID-19: [email or web address]   CDC -- What to Do If You're Sick: www.cdc.gov/coronavirus/2019-ncov/about/steps-when-sick.html   CDC -- Ending Home Isolation: www.cdc.gov/coronavirus/2019-ncov/hcp/disposition-in-home-patients.html   [facility] -- Caring for Someone:  www.cdc.gov/coronavirus/2019-ncov/if-you-are-sick/care-for-someone.html   [facility] -- Interim Guidance for Hospital Discharge to Home: www.health.[facility].[place]/diseases/coronavirus/hcp/hospdischarge.pdf   [facility] clinical trials (COVID-19 research studies): clinicalaffairs.[facility]-clinical-trials    Below are the COVID-19 hotlines at the [place] Department of Health ([facility]). Interpreters are available.    For health questions: Call 443-130-1096 or 1-881.548.7433 (7 a.m. to 7 p.m.) For questions about schools and childcare: Call 213-748-6835 or 1-118.470.2681 (7 a.m. to 7 p.m.)    COVID-19 (Coronavirus) General Information  Because there is currently no vaccine to prevent infection, the best way to protect yourself is to avoid being exposed to this virus. Common symptoms of COVID-19 include but are not limited to fever, cough, and shortness of breath. These symptoms appear 2-14 days after you are exposed to the virus that causes COVID-19. Click here for more information from the CDC on how to protect yourself.  If you are sick with COVID-19 or suspect you are infected with the virus that causes COVID-19, follow the steps here from the CDC to help prevent the disease from spreading to people in your home and community.  Click here for general information from the CDC on testing.  If you develop any of these emergency warning signs for COVID-19, get medical attention immediately:     Trouble breathing    Persistent pain or pressure in the chest    New confusion or inability to arouse    Bluish lips or face      Call your doctor or clinic before going in. Call 911 if you have a medical emergency and notify the  you have or think you may have COVID-19.  For more detailed and up to date information on COVID-19 (Coronavirus), please visit the CDC website.   Diagnosis: Acute upper respiratory infection, unspecified  Diagnosis ICD: J06.9

## 2020-10-13 DIAGNOSIS — Z20.822 SUSPECTED 2019 NOVEL CORONAVIRUS INFECTION: Primary | ICD-10-CM

## 2020-10-14 DIAGNOSIS — Z20.822 SUSPECTED 2019 NOVEL CORONAVIRUS INFECTION: ICD-10-CM

## 2020-10-20 ENCOUNTER — TELEPHONE (OUTPATIENT)
Dept: OBGYN | Facility: OTHER | Age: 30
End: 2020-10-20

## 2020-10-20 NOTE — TELEPHONE ENCOUNTER
Our goal is to have forms completed with 72 hours, however some forms may require a visit or additional information.    Who is the form from?: [facility] (if other please explain)  Where the form came from: form was faxed in  What clinic location was the form placed at?: [facility]  Where the form was placed: forms bin  What number is listed as a contact on the form?: [phone]    Phone call message- patient request for a letter, form or note:    Date needed: as soon as possible  Please fax to [phone]  Has the patient signed a consent form for release of information? NO    Additional comments:     Call taken on 10/20/2020 at 3:00 PM by Naty Arcos    Type of letter, form or note: medical

## 2020-10-22 DIAGNOSIS — Z11.59 ENCOUNTER FOR SCREENING FOR OTHER VIRAL DISEASES: ICD-10-CM

## 2020-10-22 PROCEDURE — 86769 SARS-COV-2 COVID-19 ANTIBODY: CPT | Performed by: OBSTETRICS & GYNECOLOGY

## 2020-10-22 PROCEDURE — 86769 SARS-COV-2 COVID-19 ANTIBODY: CPT | Mod: 59 | Performed by: OBSTETRICS & GYNECOLOGY

## 2020-10-22 PROCEDURE — 36415 COLL VENOUS BLD VENIPUNCTURE: CPT | Performed by: OBSTETRICS & GYNECOLOGY

## 2020-10-23 NOTE — TELEPHONE ENCOUNTER
Can not locate form.    Called and spoke with pt, pt unaware of what is it and said it could be a breast pump but she has already had it approved by her insurance and has a confirmation for the pump she order being shipped to her.    She said if she has troubles she will let us know.      Jossy Guerin CMA 10/23/2020 11:03 AM

## 2020-10-24 LAB
COVID-19 SPIKE RBD ABY TITER: NORMAL
COVID-19 SPIKE RBD ABY: POSITIVE

## 2020-11-02 ENCOUNTER — TELEPHONE (OUTPATIENT)
Dept: OBGYN | Facility: OTHER | Age: 30
End: 2020-11-02

## 2020-11-02 NOTE — TELEPHONE ENCOUNTER
Reason for Call:  Form, our goal is to have forms completed with 72 hours, however, some forms may require a visit or additional information.    Type of letter, form or note:  CMN for breast pump    Who is the form from?: [facility] (if other please explain)    Where did the form come from: form was faxed in    What clinic location was the form placed at?: [facility] - [phone]    Where the form was placed: [facility]     What number is listed as a contact on the form?: [phone]       Additional comments: sign fax back    Call taken on 11/2/2020 at 2:13 PM by Lolis Santillan

## 2021-01-09 ENCOUNTER — HEALTH MAINTENANCE LETTER (OUTPATIENT)
Age: 31
End: 2021-01-09

## 2021-02-16 ENCOUNTER — MEDICAL CORRESPONDENCE (OUTPATIENT)
Dept: HEALTH INFORMATION MANAGEMENT | Facility: CLINIC | Age: 31
End: 2021-02-16

## 2021-07-28 ENCOUNTER — E-VISIT (OUTPATIENT)
Dept: OBGYN | Facility: OTHER | Age: 31
End: 2021-07-28
Payer: COMMERCIAL

## 2021-07-28 DIAGNOSIS — Z53.9 ERRONEOUS ENCOUNTER--DISREGARD: Primary | ICD-10-CM

## 2021-07-28 ASSESSMENT — ANXIETY QUESTIONNAIRES
8. IF YOU CHECKED OFF ANY PROBLEMS, HOW DIFFICULT HAVE THESE MADE IT FOR YOU TO DO YOUR WORK, TAKE CARE OF THINGS AT HOME, OR GET ALONG WITH OTHER PEOPLE?: VERY DIFFICULT
2. NOT BEING ABLE TO STOP OR CONTROL WORRYING: NEARLY EVERY DAY
3. WORRYING TOO MUCH ABOUT DIFFERENT THINGS: NEARLY EVERY DAY
GAD7 TOTAL SCORE: 18
4. TROUBLE RELAXING: NEARLY EVERY DAY
GAD7 TOTAL SCORE: 18
GAD7 TOTAL SCORE: 18
6. BECOMING EASILY ANNOYED OR IRRITABLE: NEARLY EVERY DAY
7. FEELING AFRAID AS IF SOMETHING AWFUL MIGHT HAPPEN: NOT AT ALL
1. FEELING NERVOUS, ANXIOUS, OR ON EDGE: NEARLY EVERY DAY
5. BEING SO RESTLESS THAT IT IS HARD TO SIT STILL: NEARLY EVERY DAY
7. FEELING AFRAID AS IF SOMETHING AWFUL MIGHT HAPPEN: NOT AT ALL

## 2021-07-29 ASSESSMENT — ANXIETY QUESTIONNAIRES: GAD7 TOTAL SCORE: 18

## 2021-08-03 NOTE — PROGRESS NOTES
Assessment & Plan     RONNIE (generalized anxiety disorder)  -Patient here today with concerns for her anxiety. She notes that it is impacting her daily life and she feels that she would like to start something as needed, but not daily.   - We discussed options including counseling, PRN medication and daily medication. We discussed non-pharmacological options outside of counseling as well.   - At this time she would like to use a small dose of atarax. Discussed with her that this can cause some drowsiness so recommend she evaluate how it affects her as well.   - Recommend labs today as well.   - Denies suicidal thoughts or self-harm.   - Follow up in 3-4 weeks to recheck.   - hydrOXYzine (ATARAX) 10 MG tablet; Take 1 tablet (10 mg) by mouth 3 times daily as needed for anxiety    Screening for thyroid disorder    - TSH with free T4 reflex; Future  - TSH with free T4 reflex         The patient indicates understanding of these issues and agrees with the plan.    Patient Instructions   [facility]  [phone]    Square breathing and sensory techniques.     Working on starting Atarax 10mg as needed every 8 hours, do not take if operating machinery or driving.       PERCY Gil CNP  Questions or concerns please feel free to send me a Sko[employer] message or call me  Phone : [phone]                Return in about 1 month (around 9/6/2021) for Mood Check.    PERCY Gil CNP  [facility] DANNI Phillips is a 31 year old who presents for the following health issues     History of Present Illness       She eats 0-1 servings of fruits and vegetables daily.She consumes 1 sweetened beverage(s) daily.She exercises with enough effort to increase her heart rate 20 to 29 minutes per day.  She exercises with enough effort to increase her heart rate 4 days per week.   She is taking medications regularly.     Answers for HPI/ROS submitted by the patient on  "8/6/2021  If you checked off any problems, how difficult have these problems made it for you to do your work, take care of things at home, or get along with other people?: Somewhat difficult  PHQ9 TOTAL SCORE: 2  RONNIE 7 TOTAL SCORE: 11      Abnormal Mood Symptoms  Onset/Duration: for about 3 years  Description: anxiety   Depression (if yes, do PHQ-9): no  Anxiety (if yes, do RONNIE-7): YES  Accompanying Signs & Symptoms:  Still participating in activities that you used to enjoy: YES  Fatigue: YES  Irritability: YES  Difficulty concentrating: YES  Changes in appetite: no  Problems with sleep: YES- bc of kids  Heart racing/beating fast: no  Abnormally elevated, expansive, or irritable mood: YES  Persistently increased activity or energy: YES- at night time  Thoughts of hurting yourself or others: no  History:  Recent stress or major life event: no  Prior depression or anxiety: None  Family history of depression or anxiety: unknown  Alcohol/drug use: YES- alcohol use  Difficulty sleeping: YES  Precipitating or alleviating factors: None  Therapies tried and outcome: none  PHQ 9/20/2018 11/8/2018 8/6/2021   PHQ-9 Total Score 5 3 2   Q9: Thoughts of better off dead/self-harm past 2 weeks Not at all Not at all Not at all     RONNIE-7 SCORE 7/28/2021 8/6/2021   Total Score 18 (severe anxiety) 11 (moderate anxiety)   Total Score 18 11     '    Patient has noticed that she is short tempered all the time. Constantly raising mind. Little things are becoming bigger than normal. She thinks she has always had some and worse since having children. More debilitating and concerning. Affecting children and  more. Do not like to take medication.     Review of Systems         Objective    /88   Pulse 84   Temp 99  F (37.2  C) (Temporal)   Resp 12   Ht 1.555 m (5' 1.22\")   Wt 73.1 kg (161 lb 3.2 oz)   LMP 07/25/2021 (Exact Date)   SpO2 97%   BMI 30.24 kg/m    Body mass index is 30.24 kg/m .  Physical Exam  Neurological:     "  Mental Status: She is alert and oriented to person, place, and time.   Psychiatric:         Mood and Affect: Affect is tearful.         Behavior: Behavior normal.         Thought Content: Thought content normal.         Judgment: Judgment normal.            Results for orders placed or performed in visit on 08/06/21   TSH with free T4 reflex     Status: Normal   Result Value Ref Range    TSH 0.71 0.40 - 4.00 mU/L

## 2021-08-06 ENCOUNTER — OFFICE VISIT (OUTPATIENT)
Dept: FAMILY MEDICINE | Facility: OTHER | Age: 31
End: 2021-08-06
Payer: COMMERCIAL

## 2021-08-06 VITALS
DIASTOLIC BLOOD PRESSURE: 88 MMHG | OXYGEN SATURATION: 97 % | RESPIRATION RATE: 12 BRPM | SYSTOLIC BLOOD PRESSURE: 124 MMHG | HEIGHT: 61 IN | HEART RATE: 84 BPM | WEIGHT: 161.2 LBS | TEMPERATURE: 99 F | BODY MASS INDEX: 30.43 KG/M2

## 2021-08-06 DIAGNOSIS — Z13.29 SCREENING FOR THYROID DISORDER: ICD-10-CM

## 2021-08-06 DIAGNOSIS — F41.1 GAD (GENERALIZED ANXIETY DISORDER): Primary | ICD-10-CM

## 2021-08-06 PROBLEM — O99.019 ANEMIA DURING PREGNANCY: Status: ACTIVE | Noted: 2020-07-21

## 2021-08-06 PROBLEM — Z87.59 HISTORY OF POSTPARTUM HEMORRHAGE: Status: ACTIVE | Noted: 2020-07-21

## 2021-08-06 PROBLEM — O09.03 PREGNANCY WITH HISTORY OF INFERTILITY IN THIRD TRIMESTER: Status: ACTIVE | Noted: 2020-07-21

## 2021-08-06 PROBLEM — Z98.891 HISTORY OF CESAREAN DELIVERY: Status: ACTIVE | Noted: 2020-01-06

## 2021-08-06 LAB — TSH SERPL DL<=0.005 MIU/L-ACNC: 0.71 MU/L (ref 0.4–4)

## 2021-08-06 PROCEDURE — 99213 OFFICE O/P EST LOW 20 MIN: CPT | Performed by: NURSE PRACTITIONER

## 2021-08-06 PROCEDURE — 84443 ASSAY THYROID STIM HORMONE: CPT | Performed by: NURSE PRACTITIONER

## 2021-08-06 PROCEDURE — 36415 COLL VENOUS BLD VENIPUNCTURE: CPT | Performed by: NURSE PRACTITIONER

## 2021-08-06 RX ORDER — HYDROXYZINE HYDROCHLORIDE 10 MG/1
10 TABLET, FILM COATED ORAL 3 TIMES DAILY PRN
Qty: 30 TABLET | Refills: 0 | Status: SHIPPED | OUTPATIENT
Start: 2021-08-06

## 2021-08-06 ASSESSMENT — ANXIETY QUESTIONNAIRES
3. WORRYING TOO MUCH ABOUT DIFFERENT THINGS: NEARLY EVERY DAY
4. TROUBLE RELAXING: SEVERAL DAYS
7. FEELING AFRAID AS IF SOMETHING AWFUL MIGHT HAPPEN: SEVERAL DAYS
7. FEELING AFRAID AS IF SOMETHING AWFUL MIGHT HAPPEN: SEVERAL DAYS
2. NOT BEING ABLE TO STOP OR CONTROL WORRYING: MORE THAN HALF THE DAYS
GAD7 TOTAL SCORE: 11
1. FEELING NERVOUS, ANXIOUS, OR ON EDGE: MORE THAN HALF THE DAYS
GAD7 TOTAL SCORE: 11
5. BEING SO RESTLESS THAT IT IS HARD TO SIT STILL: NOT AT ALL
GAD7 TOTAL SCORE: 11
8. IF YOU CHECKED OFF ANY PROBLEMS, HOW DIFFICULT HAVE THESE MADE IT FOR YOU TO DO YOUR WORK, TAKE CARE OF THINGS AT HOME, OR GET ALONG WITH OTHER PEOPLE?: VERY DIFFICULT
6. BECOMING EASILY ANNOYED OR IRRITABLE: MORE THAN HALF THE DAYS

## 2021-08-06 ASSESSMENT — PATIENT HEALTH QUESTIONNAIRE - PHQ9
SUM OF ALL RESPONSES TO PHQ QUESTIONS 1-9: 2
SUM OF ALL RESPONSES TO PHQ QUESTIONS 1-9: 2
10. IF YOU CHECKED OFF ANY PROBLEMS, HOW DIFFICULT HAVE THESE PROBLEMS MADE IT FOR YOU TO DO YOUR WORK, TAKE CARE OF THINGS AT HOME, OR GET ALONG WITH OTHER PEOPLE: SOMEWHAT DIFFICULT

## 2021-08-06 ASSESSMENT — PAIN SCALES - GENERAL: PAINLEVEL: NO PAIN (0)

## 2021-08-06 ASSESSMENT — MIFFLIN-ST. JEOR: SCORE: 1382.07

## 2021-08-06 NOTE — PATIENT INSTRUCTIONS
[facility]  [phone]    Square breathing and sensory techniques.     Working on starting Atarax 10mg as needed every 8 hours, do not take if operating machinery or driving.       PERCY Gil CNP  Questions or concerns please feel free to send me a [employer] message or call me  Phone : [phone]

## 2021-08-07 ASSESSMENT — ANXIETY QUESTIONNAIRES: GAD7 TOTAL SCORE: 11

## 2021-08-07 ASSESSMENT — PATIENT HEALTH QUESTIONNAIRE - PHQ9: SUM OF ALL RESPONSES TO PHQ QUESTIONS 1-9: 2

## 2021-08-23 ENCOUNTER — VIRTUAL VISIT (OUTPATIENT)
Dept: FAMILY MEDICINE | Facility: OTHER | Age: 31
End: 2021-08-23
Payer: COMMERCIAL

## 2021-08-23 DIAGNOSIS — F41.1 GAD (GENERALIZED ANXIETY DISORDER): Primary | ICD-10-CM

## 2021-08-23 PROCEDURE — 99213 OFFICE O/P EST LOW 20 MIN: CPT | Mod: 95 | Performed by: NURSE PRACTITIONER

## 2021-08-23 PROCEDURE — 96127 BRIEF EMOTIONAL/BEHAV ASSMT: CPT | Mod: 95 | Performed by: NURSE PRACTITIONER

## 2021-08-23 ASSESSMENT — ANXIETY QUESTIONNAIRES
3. WORRYING TOO MUCH ABOUT DIFFERENT THINGS: SEVERAL DAYS
7. FEELING AFRAID AS IF SOMETHING AWFUL MIGHT HAPPEN: NOT AT ALL
1. FEELING NERVOUS, ANXIOUS, OR ON EDGE: SEVERAL DAYS
5. BEING SO RESTLESS THAT IT IS HARD TO SIT STILL: NOT AT ALL
GAD7 TOTAL SCORE: 5
2. NOT BEING ABLE TO STOP OR CONTROL WORRYING: SEVERAL DAYS
6. BECOMING EASILY ANNOYED OR IRRITABLE: SEVERAL DAYS
IF YOU CHECKED OFF ANY PROBLEMS ON THIS QUESTIONNAIRE, HOW DIFFICULT HAVE THESE PROBLEMS MADE IT FOR YOU TO DO YOUR WORK, TAKE CARE OF THINGS AT HOME, OR GET ALONG WITH OTHER PEOPLE: SOMEWHAT DIFFICULT

## 2021-08-23 ASSESSMENT — PATIENT HEALTH QUESTIONNAIRE - PHQ9: 5. POOR APPETITE OR OVEREATING: SEVERAL DAYS

## 2021-08-23 ASSESSMENT — PAIN SCALES - GENERAL: PAINLEVEL: NO PAIN (0)

## 2021-08-23 NOTE — PROGRESS NOTES
Jacqueline is a 31 year old who is being evaluated via a billable telephone visit.      What phone number would you like to be contacted at? [phone]  How would you like to obtain your AVS? MyChart      Assessment & Plan     RONNIE (generalized anxiety disorder)  - Patient notes that she is feeling the medication helps, makes her tired at times. Recommend using small amounts and following up to discuss daily medication if not controlled symptoms.   Encouraged counseling.       The patient indicates understanding of these issues and agrees with the plan.    There are no Patient Instructions on file for this visit.    No follow-ups on file.    PERCY Gil CNP  [facility]   Jacqueline is a 31 year old who presents for the following health issues     HPI     Anxiety Follow-Up    How are you doing with your anxiety since your last visit? No change    Are you having other symptoms that might be associated with anxiety? No    Have you had a significant life event? No     Are you feeling depressed? No    Do you have any concerns with your use of alcohol or other drugs? No     Taking the atarax as needed  Taken It 3-4 times, Feels like the medication   Feels more relaxed after it and more calm      Social History     Tobacco Use     Smoking status: Former Smoker     Packs/day: 1.00     Types: Cigarettes     Quit date: 3/5/2013     Years since quittin.4     Smokeless tobacco: Never Used     Tobacco comment:  1 pack every 5 days   Substance Use Topics     Alcohol use: Yes     Comment: OCC.     Drug use: No     RONNIE-7 SCORE 2021   Total Score 18 (severe anxiety) 11 (moderate anxiety) -   Total Score 18 11 5     PHQ 2018   PHQ-9 Total Score 5 3 2   Q9: Thoughts of better off dead/self-harm past 2 weeks Not at all Not at all Not at all     Last PHQ-9 2021   1.  Little interest or pleasure in doing things 0   2.  Feeling down,  depressed, or hopeless 0   3.  Trouble falling or staying asleep, or sleeping too much 0   4.  Feeling tired or having little energy 1   5.  Poor appetite or overeating 0   6.  Feeling bad about yourself 1   7.  Trouble concentrating 0   8.  Moving slowly or restless 0   Q9: Thoughts of better off dead/self-harm past 2 weeks 0   PHQ-9 Total Score 2     RONNIE-7  8/23/2021   1. Feeling nervous, anxious, or on edge 1   2. Not being able to stop or control worrying 1   3. Worrying too much about different things 1   4. Trouble relaxing 1   5. Being so restless that it is hard to sit still 0   6. Becoming easily annoyed or irritable 1   7. Feeling afraid, as if something awful might happen 0   RONNIE-7 Total Score 5   If you checked any problems, how difficult have they made it for you to do your work, take care of things at home, or get along with other people? Somewhat difficult         Review of Systems         Objective    Vitals - Patient Reported  Pain Score: No Pain (0)        Physical Exam   healthy, alert and no distress  PSYCH: Alert and oriented times 3; coherent speech, normal   rate and volume, able to articulate logical thoughts, able   to abstract reason, no tangential thoughts, no hallucinations   or delusions  Her affect is normal  RESP: No cough, no audible wheezing, able to talk in full sentences  Remainder of exam unable to be completed due to telephone visits    Diagnostic: None          Phone call duration: 6 minutes

## 2021-08-24 ASSESSMENT — ANXIETY QUESTIONNAIRES: GAD7 TOTAL SCORE: 5

## 2021-08-30 ENCOUNTER — MYC MEDICAL ADVICE (OUTPATIENT)
Dept: OBGYN | Facility: CLINIC | Age: 31
End: 2021-08-30

## 2021-08-30 ENCOUNTER — LAB (OUTPATIENT)
Dept: LAB | Facility: CLINIC | Age: 31
End: 2021-08-30
Payer: COMMERCIAL

## 2021-08-30 DIAGNOSIS — O36.80X0 PREGNANCY WITH UNCERTAIN FETAL VIABILITY, SINGLE OR UNSPECIFIED FETUS: Primary | ICD-10-CM

## 2021-08-30 DIAGNOSIS — O36.80X0 PREGNANCY WITH UNCERTAIN FETAL VIABILITY, SINGLE OR UNSPECIFIED FETUS: ICD-10-CM

## 2021-08-30 LAB — B-HCG SERPL-ACNC: 1120 IU/L (ref 0–5)

## 2021-08-30 PROCEDURE — 84702 CHORIONIC GONADOTROPIN TEST: CPT

## 2021-08-30 PROCEDURE — 36415 COLL VENOUS BLD VENIPUNCTURE: CPT

## 2021-08-30 NOTE — TELEPHONE ENCOUNTER
Pt now asking how soon she will be able to know if her pregnancy is ectopic and if she is at a higher risk of having a miscarriage.    RN routing to provider for advisement or if telephone call for further discussion is recommended.    Shawna Traore RN on 8/30/2021 at 2:12 PM

## 2021-08-30 NOTE — TELEPHONE ENCOUNTER
Pt last seen for  on 2020.    LMP 2021, currently 5w3d. Hx of regular periods. Two positive HPTs today.    Pt states she has an increased risk of ectopic pregnancy due to past infertility concerns. Pt denies vaginal bleeding or cramping at this time. Pt having some back pain.    RN routing to provider for advisement on HCG quants or in clinic appt first.    Shawna Traore RN on 2021 at 12:59 PM

## 2021-08-31 NOTE — TELEPHONE ENCOUNTER
Talked to patient regarding her concerns.  This is first spontaneous pregnancy.  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  She has never had an ectopic or miscarriage.     Plan quant tomorrow.  Assuming it will double, she may get an ultrasound on Friday.      She will contact us or go to the ED if she develops severe pain or heavy bleeding.

## 2021-08-31 NOTE — TELEPHONE ENCOUNTER
"Per 8/30 HCG quant lab result note:  \"Your hormone level is as expected.  I recommend you repeat the hormone level on Wednesday.  I have placed the order, so you can schedule a lab only appointment at your convenience.  Please let me know if you have any questions or concerns.\"    Pt is requesting an US asap.  I explained to her that another HCG quant level needs to be drawn first and also asked if she is having concerning symptoms like cramping, pain, bleeding. I also explained to her that if she is not far enough along an US will not show us much.      Pt did not tell me if she is having any symptoms as to why she is requesting an US ASAP, per past messages she is concerned about an ectopic pregnancy due to past infertility concerns.    Routing to Dr. Norris to further advise on an US order ASAP as Dr. Norris is still tracking HCG quants.    Naty Daigle RN            "

## 2021-09-01 ENCOUNTER — LAB (OUTPATIENT)
Dept: LAB | Facility: CLINIC | Age: 31
End: 2021-09-01
Payer: COMMERCIAL

## 2021-09-01 DIAGNOSIS — O36.80X0 PREGNANCY WITH UNCERTAIN FETAL VIABILITY, SINGLE OR UNSPECIFIED FETUS: ICD-10-CM

## 2021-09-01 LAB — B-HCG SERPL-ACNC: 2917 IU/L (ref 0–5)

## 2021-09-01 PROCEDURE — 84702 CHORIONIC GONADOTROPIN TEST: CPT

## 2021-09-01 PROCEDURE — 36415 COLL VENOUS BLD VENIPUNCTURE: CPT

## 2021-09-02 DIAGNOSIS — O34.219 HISTORY OF CESAREAN DELIVERY AFFECTING PREGNANCY: ICD-10-CM

## 2021-09-02 DIAGNOSIS — Z98.891 HISTORY OF CESAREAN DELIVERY: ICD-10-CM

## 2021-09-02 DIAGNOSIS — O36.80X0 PREGNANCY WITH UNCERTAIN FETAL VIABILITY, SINGLE OR UNSPECIFIED FETUS: Primary | ICD-10-CM

## 2021-09-02 PROBLEM — O09.819 PREGNANCY CONCEIVED THROUGH IN VITRO FERTILIZATION: Status: RESOLVED | Noted: 2018-03-29 | Resolved: 2021-09-02

## 2021-09-02 PROBLEM — O99.019 ANEMIA DURING PREGNANCY: Status: RESOLVED | Noted: 2020-07-21 | Resolved: 2021-09-02

## 2021-09-02 PROBLEM — O99.019 ANEMIA AFFECTING PREGNANCY: Status: RESOLVED | Noted: 2018-07-19 | Resolved: 2021-09-02

## 2021-09-02 PROBLEM — O09.03 PREGNANCY WITH HISTORY OF INFERTILITY IN THIRD TRIMESTER: Status: RESOLVED | Noted: 2020-07-21 | Resolved: 2021-09-02

## 2021-09-02 NOTE — RESULT ENCOUNTER NOTE
Hi!    Your pregnancy hormone level has increased normally.  It looks like you have an ultrasound scheduled tomorrow at [facility].  I will place another order for a pregnancy hormone level and you can have your routine OB labs done at the same time.  I have a urine test ordered as well, but you may need to start with a full bladder for the ultrasound.  If they have you empty it to do the transvaginal portion, then see if they can give you a cup for lab.      I am out of the office tomorrow (post 24 hour shift).  If we are not able to see a heart beat, we will plan to repeat the ultrasound in 10 days or so.  If you have an urgent concern (bleeding, pain, etc), one of my colleagues should be able to help you.      Again, congratulations!  Dr. Norris

## 2021-09-03 ENCOUNTER — ANCILLARY PROCEDURE (OUTPATIENT)
Dept: ULTRASOUND IMAGING | Facility: OTHER | Age: 31
End: 2021-09-03
Attending: OBSTETRICS & GYNECOLOGY
Payer: COMMERCIAL

## 2021-09-03 ENCOUNTER — APPOINTMENT (OUTPATIENT)
Dept: LAB | Facility: OTHER | Age: 31
End: 2021-09-03
Payer: COMMERCIAL

## 2021-09-03 DIAGNOSIS — O34.219 HISTORY OF CESAREAN DELIVERY AFFECTING PREGNANCY: ICD-10-CM

## 2021-09-03 DIAGNOSIS — O36.80X0 PREGNANCY WITH UNCERTAIN FETAL VIABILITY, SINGLE OR UNSPECIFIED FETUS: ICD-10-CM

## 2021-09-03 LAB
ALBUMIN UR-MCNC: NEGATIVE MG/DL
APPEARANCE UR: CLEAR
BACTERIA #/AREA URNS HPF: ABNORMAL /HPF
BILIRUB UR QL STRIP: NEGATIVE
COLOR UR AUTO: YELLOW
GLUCOSE UR STRIP-MCNC: NEGATIVE MG/DL
HGB UR QL STRIP: NEGATIVE
KETONES UR STRIP-MCNC: NEGATIVE MG/DL
LEUKOCYTE ESTERASE UR QL STRIP: NEGATIVE
NITRATE UR QL: NEGATIVE
PH UR STRIP: 7 [PH] (ref 5–7)
RBC #/AREA URNS AUTO: ABNORMAL /HPF
SP GR UR STRIP: 1.01 (ref 1–1.03)
SQUAMOUS #/AREA URNS AUTO: ABNORMAL /LPF
UROBILINOGEN UR STRIP-ACNC: 0.2 E.U./DL
WBC #/AREA URNS AUTO: ABNORMAL /HPF

## 2021-09-03 PROCEDURE — 76817 TRANSVAGINAL US OBSTETRIC: CPT | Performed by: RADIOLOGY

## 2021-09-03 PROCEDURE — 81001 URINALYSIS AUTO W/SCOPE: CPT

## 2021-09-03 PROCEDURE — 87086 URINE CULTURE/COLONY COUNT: CPT

## 2021-09-03 PROCEDURE — 76801 OB US < 14 WKS SINGLE FETUS: CPT | Performed by: RADIOLOGY

## 2021-09-04 LAB — BACTERIA UR CULT: NORMAL

## 2021-09-08 ENCOUNTER — LAB (OUTPATIENT)
Dept: LAB | Facility: OTHER | Age: 31
End: 2021-09-08
Payer: COMMERCIAL

## 2021-09-08 ENCOUNTER — MYC MEDICAL ADVICE (OUTPATIENT)
Dept: OBGYN | Facility: CLINIC | Age: 31
End: 2021-09-08

## 2021-09-08 DIAGNOSIS — O34.219 HISTORY OF CESAREAN DELIVERY AFFECTING PREGNANCY: ICD-10-CM

## 2021-09-08 DIAGNOSIS — O36.80X0 PREGNANCY WITH UNCERTAIN FETAL VIABILITY, SINGLE OR UNSPECIFIED FETUS: ICD-10-CM

## 2021-09-08 LAB
ABO/RH(D): NORMAL
ANTIBODY SCREEN: NEGATIVE
B-HCG SERPL-ACNC: ABNORMAL IU/L (ref 0–5)
ERYTHROCYTE [DISTWIDTH] IN BLOOD BY AUTOMATED COUNT: 13.9 % (ref 10–15)
HCT VFR BLD AUTO: 36.4 % (ref 35–47)
HGB BLD-MCNC: 11.6 G/DL (ref 11.7–15.7)
MCH RBC QN AUTO: 25.8 PG (ref 26.5–33)
MCHC RBC AUTO-ENTMCNC: 31.9 G/DL (ref 31.5–36.5)
MCV RBC AUTO: 81 FL (ref 78–100)
PLATELET # BLD AUTO: 263 10E3/UL (ref 150–450)
RBC # BLD AUTO: 4.5 10E6/UL (ref 3.8–5.2)
SPECIMEN EXPIRATION DATE: NORMAL
WBC # BLD AUTO: 8.4 10E3/UL (ref 4–11)

## 2021-09-08 PROCEDURE — 86900 BLOOD TYPING SEROLOGIC ABO: CPT

## 2021-09-08 PROCEDURE — 86762 RUBELLA ANTIBODY: CPT

## 2021-09-08 PROCEDURE — 86850 RBC ANTIBODY SCREEN: CPT

## 2021-09-08 PROCEDURE — 86901 BLOOD TYPING SEROLOGIC RH(D): CPT

## 2021-09-08 PROCEDURE — 87389 HIV-1 AG W/HIV-1&-2 AB AG IA: CPT

## 2021-09-08 PROCEDURE — 86803 HEPATITIS C AB TEST: CPT

## 2021-09-08 PROCEDURE — 87340 HEPATITIS B SURFACE AG IA: CPT

## 2021-09-08 PROCEDURE — 85027 COMPLETE CBC AUTOMATED: CPT

## 2021-09-08 PROCEDURE — 86780 TREPONEMA PALLIDUM: CPT

## 2021-09-08 PROCEDURE — 36415 COLL VENOUS BLD VENIPUNCTURE: CPT

## 2021-09-08 PROCEDURE — 84702 CHORIONIC GONADOTROPIN TEST: CPT

## 2021-09-09 DIAGNOSIS — O36.80X0 PREGNANCY WITH UNCERTAIN FETAL VIABILITY, SINGLE OR UNSPECIFIED FETUS: Primary | ICD-10-CM

## 2021-09-09 LAB
HBV SURFACE AG SERPL QL IA: NONREACTIVE
HCV AB SERPL QL IA: NONREACTIVE
HIV 1+2 AB+HIV1 P24 AG SERPL QL IA: NONREACTIVE
RUBV IGG SERPL QL IA: 1.46 INDEX
RUBV IGG SERPL QL IA: POSITIVE
T PALLIDUM AB SER QL: NONREACTIVE

## 2021-09-09 NOTE — TELEPHONE ENCOUNTER
Pt had HCG done yesterday showing 23,957. Pt had US done on 9/3/2021, not yet interpreted.     RN routing to provider for advisement if pt is again needing additional US scheduled for next week as order was placed today.    Shawna Traore RN on 9/9/2021 at 8:42 AM

## 2021-09-13 ENCOUNTER — ANCILLARY PROCEDURE (OUTPATIENT)
Dept: ULTRASOUND IMAGING | Facility: OTHER | Age: 31
End: 2021-09-13
Attending: OBSTETRICS & GYNECOLOGY
Payer: COMMERCIAL

## 2021-09-13 DIAGNOSIS — O36.80X0 PREGNANCY WITH UNCERTAIN FETAL VIABILITY, SINGLE OR UNSPECIFIED FETUS: ICD-10-CM

## 2021-09-13 PROCEDURE — 76801 OB US < 14 WKS SINGLE FETUS: CPT | Performed by: RADIOLOGY

## 2021-09-13 PROCEDURE — 76817 TRANSVAGINAL US OBSTETRIC: CPT | Performed by: RADIOLOGY

## 2021-10-19 ENCOUNTER — MYC MEDICAL ADVICE (OUTPATIENT)
Dept: OBGYN | Facility: CLINIC | Age: 31
End: 2021-10-19

## 2021-10-23 ENCOUNTER — HEALTH MAINTENANCE LETTER (OUTPATIENT)
Age: 31
End: 2021-10-23

## 2021-10-26 ENCOUNTER — PRENATAL OFFICE VISIT (OUTPATIENT)
Dept: OBGYN | Facility: CLINIC | Age: 31
End: 2021-10-26
Payer: COMMERCIAL

## 2021-10-26 VITALS
DIASTOLIC BLOOD PRESSURE: 77 MMHG | HEIGHT: 62 IN | TEMPERATURE: 98.1 F | BODY MASS INDEX: 29.89 KG/M2 | SYSTOLIC BLOOD PRESSURE: 117 MMHG | WEIGHT: 162.44 LBS | HEART RATE: 83 BPM

## 2021-10-26 DIAGNOSIS — Z98.891 HISTORY OF CESAREAN DELIVERY: ICD-10-CM

## 2021-10-26 DIAGNOSIS — Z12.4 SCREENING FOR MALIGNANT NEOPLASM OF CERVIX: ICD-10-CM

## 2021-10-26 DIAGNOSIS — Z23 NEED FOR TDAP VACCINATION: ICD-10-CM

## 2021-10-26 DIAGNOSIS — Z34.01 ENCOUNTER FOR SUPERVISION OF NORMAL FIRST PREGNANCY IN FIRST TRIMESTER: Primary | ICD-10-CM

## 2021-10-26 PROCEDURE — 87624 HPV HI-RISK TYP POOLED RSLT: CPT | Performed by: ADVANCED PRACTICE MIDWIFE

## 2021-10-26 PROCEDURE — G0145 SCR C/V CYTO,THINLAYER,RESCR: HCPCS | Performed by: ADVANCED PRACTICE MIDWIFE

## 2021-10-26 PROCEDURE — 99207 PR FIRST OB VISIT: CPT | Performed by: ADVANCED PRACTICE MIDWIFE

## 2021-10-26 RX ORDER — PRENATAL VIT/IRON FUM/FOLIC AC 27MG-0.8MG
1 TABLET ORAL DAILY
COMMUNITY

## 2021-10-26 ASSESSMENT — MIFFLIN-ST. JEOR: SCORE: 1400.06

## 2021-10-26 NOTE — PATIENT INSTRUCTIONS
Thank for choosing our clinic for your health care needs. Our goal is to provided you with excellent care. One way that we continue to improve our care is by listening to our patients. Please take a few minutes to complete the written survey that you may receive in the mail after your visit.     You may reach your care team by calling the following number:    [facility] Dom[facility]ng[facility]- [phone]   For clinic hours at [facility] [place]  please visit the [employer] web site [email or web address]    Notification of your lab results:  Normal or non-critical lab and imaging results will be communicated to you by Mychart, letter, or phone within 7 days. If you do not hear from us within 10 days, please contact us through [employer] or phone. If you have a critical or abnormal lab result, we will notify you by phone as soon as possible.  Pap smears often take a bit longer.       Medication Refills:  Please contact your pharmacy and they will forward the refill to us. Please allow 3 business days for your refills to be completed.     Secure access to your medical record:  Use [employer] (secure email communication and access to your chart) to send your primary care provider a message or make an appointment. Ask someone on your Team how to sign up for [employer]. To log on to [employer] or for more information in [employer] please visit the website at [email or web address].            How to prevent CMV or cytomegalovirus infection while you are pregnant:    Thoroughly wash your hands with soap and warm water especially after doing things such as:  Diaper changes  Feeding or bathing a child  Wiping a child's runny nose or drool  Handling a child's toys    Do not share cups, plates, utensils, toothbrushes or food with your children  Do not kiss young children on the mouth or cheek. Instead, kiss them on the head or give them a hug.  Do not share towels or washcloths with young children.  Clean toy, counter tops, and other surfaces that come in  contact with urine or saliva.        LISTERIA  Individuals can reduce the risk for listeria contamination through proper food selection, handling, and storage, such as:    Rinsing raw produce (fuits and vegetables) before eating, cutting, or cooking;    Keeping refrigerators at 40 degrees F or lower;    Buying soft cheeses only if their labels state that they are made with pasteurized milk.  Also avoiding cheese that has not been initially wrapped in plastic.  These cheeses include brie, camembert, blue and the soft Mexican cheeses like con queso.    Heating all food that can be heated but especially hot dogs, luncheon meats, and cold cuts to an internal temperature of 165 degrees F or until steaming hot before serving them; and    Washing your hands for at least 20 seconds with warm water and soap before and after handling cantaloupes or other melons.    Watch for food recalls for listeria and contact us if you believe you have been exposed.               First line remedies for nausea in pregnancy    Eat small frequent meals every 2-3 hours if possible.   Avoid food at extremes of temparture and drinks with carbination.  Eat foods that appeal to you, avoiding fats and spicy foods.  Bread, pasta, crackers, potatoes, and rice tend to be tolerated the best.  Don't worry about what you eat in the first 3 months, it is more important that you can eat and keep it down.   Try flat ginger ale.  Ginger is a herbal remedy for nausea and you can use it in any form.  There are ginger tablets you can purchase.  The dose is 500 to 1000 mg a day.   You may also try doxylamine 12.5 mg three times a day which is a sleeping medication along with Vitamin B6 25 mg three times a day.  This combination takes up to a week to work so give it some time.  Be sure to take both the doxylamine and B6  Doxylamine is sometimes called Unisom and it comes in 25 mg tablets so you will have to break it in half and take half a tablet.   It is  important to take the Unisom and B6 together or it won't be effective.  If you begin to vomit more than 5 or 6 times a day and feel that you are unable to keep anything down, call the clinic for an appointment to be seen.                Fruits and vegetables high in pesticide contamination  Strawberries  Spinach  Kale  Nectarines  Peaches  Apples  Grapes  Cherries  Pears  Tomatoes  Celery  Potatoes  Hot Peppers.     Consider extra washing of these fruits and vegetables, peeling if possible or purchasing organics.     Fruits and vegetables lowest if pesticide contramination:  Avocado  Sweet corn  Pineapple  Cabbage   Onions   Frozen peas  Papaya  Asparagus  Mushrooms  Eggplant  Honeydew  Kiwi  Cantaloupe  Cauliflower  Broccoli      Consider eliminating or reducing the following additives in personal care products and make up:  Triclosan  Paraben  Phthalates  Phenols  Products that do not contain these additives are often found in the organic or green sections of stores.

## 2021-10-26 NOTE — PROGRESS NOTES
Jacqueline Bolden is a 31 year old  who presents to the clinic for an new ob visit.   Estimated Date of Delivery: May 1, 2022 is calculated from Patient's last menstrual period was 2021 (exact date).   She is excited.  This is her first spontaneous and there fore unplanned pregnancy  She has not had bleeding since her LMP.   She has not had nausea. Weigh loss has not occurred.     HISTORY  updated and reviewed  Past Medical History:   Diagnosis Date     Dysmenorrhea      History of infertility      Past Surgical History:   Procedure Laterality Date      SECTION  10/25/2018      SECTION  /    Repeat LTCS, prior  section desires repeat, pregnancy with history of infertility     Social History     Socioeconomic History     Marital status:      Spouse name: Not on file     Number of children: Not on file     Years of education: Not on file     Highest education level: Not on file   Occupational History     Occupation: student     Employer: 0[employer]UDENT     Comment: [employer]     Occupation: [employer]     Employer: [employer]UDENT   Tobacco Use     Smoking status: Former Smoker     Packs/day: 1.00     Types: Cigarettes     Quit date: 3/5/2013     Years since quittin.6     Smokeless tobacco: Never Used     Tobacco comment:  1 pack every 5 days   Substance and Sexual Activity     Alcohol use: Not Currently     Comment: OCC.     Drug use: No     Sexual activity: Yes     Partners: Male     Birth control/protection: None   Other Topics Concern     Parent/sibling w/ CABG, MI or angioplasty before 65F 55M? Not Asked   Social History Narrative    Lives with housemates. No domestic violence issues.     Social Determinants of Health     Financial Resource Strain:      Difficulty of Paying Living Expenses:    Food Insecurity:      Worried About Running Out of Food in the Last Year:      Ran Out of Food in the Last Year:    Transportation Needs:      Lack  "of Transportation (Medical):      Lack of Transportation (Non-Medical):    Physical Activity:      Days of Exercise per Week:      Minutes of Exercise per Session:    Stress:      Feeling of Stress :    Social Connections:      Frequency of Communication with Friends and Family:      Frequency of Social Gatherings with Friends and Family:      Attends Oriental orthodox Services:      Active Member of Clubs or Organizations:      Attends Club or Organization Meetings:      Marital Status:    Intimate Partner Violence:      Fear of Current or Ex-Partner:      Emotionally Abused:      Physically Abused:      Sexually Abused:      Health Maintenance   Topic Date Due     ADVANCE CARE PLANNING  Never done     Pneumococcal Vaccine: Pediatrics (0 to 5 Years) and At-Risk Patients (6 to 64 Years) (1 of 4 - PCV13) Never done     COVID-19 Vaccine (1) Never done     PREVENTIVE CARE VISIT  06/26/2009     PAP  03/29/2021     INFLUENZA VACCINE (1) Never done     MATERNAL SCREENING  11/07/2021     OBGCT (OB)  01/09/2022     REPEAT ANTIBODY SCREEN (OB)  02/06/2022     GROUP B STREP SCREENING  04/03/2022     DTAP/TDAP/TD IMMUNIZATION (9 - Td or Tdap) 05/15/2030     HEPATITIS C SCREENING  Completed     HIV SCREENING  Completed     PHQ-2  Completed     IPV IMMUNIZATION  Completed     HEPATITIS B IMMUNIZATION  Completed     MENINGITIS IMMUNIZATION  Aged Out     Family History   Problem Relation Age of Onset     Family History Negative No family hx of             ROS: 10 point ROS neg other than the symptoms noted above in the HPI.      PHYSICAL EXAM  /77 (BP Location: Right arm, Patient Position: Sitting, Cuff Size: Adult Regular)   Pulse 83   Temp 98.1  F (36.7  C) (Tympanic)   Ht 1.575 m (5' 2\")   Wt 73.7 kg (162 lb 7 oz)   LMP 07/25/2021 (Exact Date)   BMI 29.71 kg/m    GENERAL:  Pleasant pregnant female, alert, cooperative  and well groomed.  SKIN:  Warm and dry, without lesions or rashes  HEAD: Symmetrical features.  EYES:  " PERRLA.  EARS: Tympanic membranes gray, translucent and intact.  NECK:  Thyroid without enlargement and nodules.  Lymph nodes not palpable.   LUNGS:  Clear to auscultation.  BREAST:  Symmetrical.  No dominant, fixed or suspicious masses are noted.  No skin or nipple changes or axillary nodes.    Nipples everted.      HEART:  RRR with  out murmur.  ABDOMEN: Soft without masses , tenderness or organomegaly.  No CVA tenderness.  Uterus palpable at size equal to dates.   MUSCULOSKELETAL:  Full range of motion  GENITALIA: EGBUS normal. Vulva reveals no erythema or lesions.       VAGINA:  pink, normal ruga and discharge, no lesions.        CERVIX:  smooth, without discharge or CMT .                  EXTREMITIES:  No edema. No significant varicosities.    ASSESSMENT:  Intrauterine pregnancy of 13w2d  (Z34.01) Encounter for supervision of normal first pregnancy in first trimester  (primary encounter diagnosis)  Comment:   Plan:     (Z23) Need for Tdap vaccination  Comment:   Plan:     (Z98.891) History of  delivery  Comment:   Plan:     (Z12.4) Screening for malignant neoplasm of cervix  Comment:   Plan: Pap screen with HPV - recommended age 30 - 65         years              PLAN:  Options for  testing for chromosomal and birth defects were discussed with the patient. Diagnostic tests include CVS and Amniocentesis. We discussed that these tests are definitive but invasive and do carry a risk of fetal loss.   Screening tests include nuchal translucency/blood marker testing in the first trimester and quad screening in the second trimester. We discussed that these are screening tests and not diagnostic tests and that false positives and negatives are a distinct possibility.  Declines  testing.    Instructed on best evidence for: weight gain for her weight and height for pregnancy; healthy diet and foods to avoid; exercise and activity during pregnancy;avoiding exposure to toxoplasmosis; and  maintenance of a generally healthy lifestyle.     Int[facility] hospital [facility] is [facility].  Reviewed transmission of and avoidance strategies for CMV.      Planning a repeat .    Patient does not meet criteria for low dose Asprin therapy.   Discussed and recommended COVID vaccine.

## 2021-10-28 LAB
BKR LAB AP GYN ADEQUACY: NORMAL
BKR LAB AP GYN INTERPRETATION: NORMAL
BKR LAB AP HPV REFLEX: NORMAL
BKR LAB AP LMP: NORMAL
BKR LAB AP PREVIOUS ABNORMAL: NORMAL
PATH REPORT.COMMENTS IMP SPEC: NORMAL
PATH REPORT.RELEVANT HX SPEC: NORMAL

## 2021-11-01 LAB
HUMAN PAPILLOMA VIRUS 16 DNA: NEGATIVE
HUMAN PAPILLOMA VIRUS 18 DNA: NEGATIVE
HUMAN PAPILLOMA VIRUS FINAL DIAGNOSIS: NORMAL
HUMAN PAPILLOMA VIRUS OTHER HR: NEGATIVE

## 2021-12-02 ENCOUNTER — PRENATAL OFFICE VISIT (OUTPATIENT)
Dept: OBGYN | Facility: OTHER | Age: 31
End: 2021-12-02
Payer: COMMERCIAL

## 2021-12-02 VITALS
OXYGEN SATURATION: 100 % | DIASTOLIC BLOOD PRESSURE: 68 MMHG | SYSTOLIC BLOOD PRESSURE: 122 MMHG | HEART RATE: 80 BPM | BODY MASS INDEX: 30.82 KG/M2 | WEIGHT: 168.5 LBS

## 2021-12-02 DIAGNOSIS — O34.219 HISTORY OF CESAREAN DELIVERY AFFECTING PREGNANCY: Primary | ICD-10-CM

## 2021-12-02 PROCEDURE — 99207 PR PRENATAL VISIT: CPT | Performed by: OBSTETRICS & GYNECOLOGY

## 2021-12-02 NOTE — PROGRESS NOTES
Presents for routine  appointment.      No abnormal discharge , no leaking fluid , no contractions , no vaginal bleeding    ROS:   and GI  negative.     Please see Prenatal Vitals and Notes Flowsheet for objective data.    A/P:  31 year old  at 18w4d       ICD-10-CM    1. History of  delivery affecting pregnancy  O34.219 US OB > 14 Weeks       20 week ultrasound scheduled   Follow up in 4  week(s).      Anna Norris MD

## 2021-12-06 ENCOUNTER — ANCILLARY PROCEDURE (OUTPATIENT)
Dept: ULTRASOUND IMAGING | Facility: CLINIC | Age: 31
End: 2021-12-06
Attending: OBSTETRICS & GYNECOLOGY
Payer: COMMERCIAL

## 2021-12-06 DIAGNOSIS — O34.219 HISTORY OF CESAREAN DELIVERY AFFECTING PREGNANCY: ICD-10-CM

## 2021-12-06 PROCEDURE — 76805 OB US >/= 14 WKS SNGL FETUS: CPT | Performed by: RADIOLOGY

## 2021-12-09 ENCOUNTER — MYC MEDICAL ADVICE (OUTPATIENT)
Dept: OBGYN | Facility: OTHER | Age: 31
End: 2021-12-09
Payer: COMMERCIAL

## 2021-12-17 ENCOUNTER — MYC MEDICAL ADVICE (OUTPATIENT)
Dept: OBGYN | Facility: OTHER | Age: 31
End: 2021-12-17
Payer: COMMERCIAL

## 2021-12-17 NOTE — TELEPHONE ENCOUNTER
"Pt last seen 2021, currently 20w5d, hx of .    Pt having concern for pressure at \"top of vagina\" when walking, stating it causes her to limp especially when stepping on L) foot, pressure primarily on L) side of groin. Pt denies contractions or that pressure is rhythmic or worsening. Pt states this has persisted for last 2 weeks.    Pt denies vaginal bleeding or loss of fluids, has tried gentle stretching without relief. Pt states pressure more relieved when laying down, primarily notices it when sitting and walking.    RN routing to provider for advisement on possible belly band or other recommendations.    Shawna Traore RN on 2021 at 11:01 AM    "

## 2021-12-17 NOTE — TELEPHONE ENCOUNTER
RN spoke with Dr. Norris in clinic who recommends a belly band as well and states they are cheapest on [employer].    RN relayed this to pt and when to reach out for worsening symptoms.    Shawna Traore RN on 12/17/2021 at 12:33 PM

## 2022-01-06 ENCOUNTER — PRENATAL OFFICE VISIT (OUTPATIENT)
Dept: OBGYN | Facility: OTHER | Age: 32
End: 2022-01-06
Payer: COMMERCIAL

## 2022-01-06 VITALS
HEART RATE: 78 BPM | WEIGHT: 175.75 LBS | SYSTOLIC BLOOD PRESSURE: 102 MMHG | BODY MASS INDEX: 32.15 KG/M2 | OXYGEN SATURATION: 100 % | DIASTOLIC BLOOD PRESSURE: 62 MMHG

## 2022-01-06 DIAGNOSIS — O26.899 PELVIC PAIN IN PREGNANCY: ICD-10-CM

## 2022-01-06 DIAGNOSIS — O34.219 HISTORY OF CESAREAN DELIVERY AFFECTING PREGNANCY: Primary | ICD-10-CM

## 2022-01-06 DIAGNOSIS — R10.2 PELVIC PAIN IN PREGNANCY: ICD-10-CM

## 2022-01-06 PROCEDURE — 99207 PR PRENATAL VISIT: CPT | Performed by: OBSTETRICS & GYNECOLOGY

## 2022-01-12 ENCOUNTER — LAB (OUTPATIENT)
Dept: LAB | Facility: OTHER | Age: 32
End: 2022-01-12
Payer: COMMERCIAL

## 2022-01-12 DIAGNOSIS — O34.219 HISTORY OF CESAREAN DELIVERY AFFECTING PREGNANCY: ICD-10-CM

## 2022-01-12 LAB
GLUCOSE 1H P 50 G GLC PO SERPL-MCNC: 131 MG/DL (ref 70–129)
HGB BLD-MCNC: 10.8 G/DL (ref 11.7–15.7)

## 2022-01-12 PROCEDURE — 85018 HEMOGLOBIN: CPT

## 2022-01-12 PROCEDURE — 36415 COLL VENOUS BLD VENIPUNCTURE: CPT

## 2022-01-12 PROCEDURE — 82950 GLUCOSE TEST: CPT

## 2022-01-13 DIAGNOSIS — O99.810 ABNORMAL MATERNAL GLUCOSE TOLERANCE, ANTEPARTUM: Primary | ICD-10-CM

## 2022-01-17 ENCOUNTER — LAB (OUTPATIENT)
Dept: LAB | Facility: CLINIC | Age: 32
End: 2022-01-17
Payer: COMMERCIAL

## 2022-01-17 DIAGNOSIS — O99.810 ABNORMAL MATERNAL GLUCOSE TOLERANCE, ANTEPARTUM: ICD-10-CM

## 2022-01-17 LAB
GESTATIONAL GTT 1 HR POST DOSE: 96 MG/DL (ref 60–179)
GESTATIONAL GTT 2 HR POST DOSE: 110 MG/DL (ref 60–154)
GESTATIONAL GTT 3 HR POST DOSE: 82 MG/DL (ref 60–139)
GLUCOSE P FAST SERPL-MCNC: 85 MG/DL (ref 60–94)

## 2022-01-17 PROCEDURE — 82952 GTT-ADDED SAMPLES: CPT

## 2022-01-17 PROCEDURE — 36415 COLL VENOUS BLD VENIPUNCTURE: CPT

## 2022-01-17 PROCEDURE — 82951 GLUCOSE TOLERANCE TEST (GTT): CPT

## 2022-02-03 ENCOUNTER — PRENATAL OFFICE VISIT (OUTPATIENT)
Dept: OBGYN | Facility: OTHER | Age: 32
End: 2022-02-03
Payer: COMMERCIAL

## 2022-02-03 VITALS
WEIGHT: 183.5 LBS | BODY MASS INDEX: 33.56 KG/M2 | DIASTOLIC BLOOD PRESSURE: 72 MMHG | SYSTOLIC BLOOD PRESSURE: 100 MMHG | OXYGEN SATURATION: 100 % | HEART RATE: 78 BPM

## 2022-02-03 DIAGNOSIS — O34.219 HISTORY OF CESAREAN DELIVERY AFFECTING PREGNANCY: Primary | ICD-10-CM

## 2022-02-03 DIAGNOSIS — Z30.2 ENCOUNTER FOR STERILIZATION: ICD-10-CM

## 2022-02-03 PROCEDURE — 99207 PR PRENATAL VISIT: CPT | Performed by: OBSTETRICS & GYNECOLOGY

## 2022-02-03 NOTE — PROGRESS NOTES
Presents for routine  appointment.     No complaints.    No abnormal discharge , no leaking fluid , no contractions aside from BH , no vaginal bleeding    ROS:   and GI  negative.     Please see Prenatal Vitals and Notes Flowsheet for objective data.  Hemoglobin   Date Value Ref Range Status   2022 10.8 (L) 11.7 - 15.7 g/dL Final   2020 10.6 (L) 11.7 - 15.7 g/dL Final       A/P:  31 year old  at 27w4d       ICD-10-CM    1. History of  delivery affecting pregnancy  O34.219    2. Encounter for sterilization  Z30.2        GCT Abnormal: 131.  Normal 3 hr.   TDAP today.    Rhogam: not needed  Discussed kick counts   Estimated Date of Delivery: May 1, 2022.  She will be 39 weeks .  I am on call on the  and will try to get it scheduled then.  Plan tubal.  Consent signed today  Follow up in 2 weeks.      Anna Norris MD

## 2022-02-12 ENCOUNTER — HEALTH MAINTENANCE LETTER (OUTPATIENT)
Age: 32
End: 2022-02-12

## 2022-02-21 ENCOUNTER — NURSE TRIAGE (OUTPATIENT)
Dept: NURSING | Facility: CLINIC | Age: 32
End: 2022-02-21
Payer: COMMERCIAL

## 2022-02-22 ENCOUNTER — TELEPHONE (OUTPATIENT)
Dept: FAMILY MEDICINE | Facility: OTHER | Age: 32
End: 2022-02-22
Payer: COMMERCIAL

## 2022-02-22 DIAGNOSIS — O21.9 NAUSEA AND VOMITING DURING PREGNANCY: Primary | ICD-10-CM

## 2022-02-22 RX ORDER — ONDANSETRON 8 MG/1
8 TABLET, ORALLY DISINTEGRATING ORAL EVERY 8 HOURS PRN
Qty: 12 TABLET | Refills: 0 | Status: SHIPPED | OUTPATIENT
Start: 2022-02-22 | End: 2022-02-24

## 2022-02-22 NOTE — TELEPHONE ENCOUNTER
Patient was seen in ED last night due to nausea and vomiting. Patient was to have zofran prescription sent to her pharmacy per discharge notes, however this was never sent.   She contacted the emergency department and they told her to follow up with her primary provider.     Routing message to provider to please prescribe prescription of zofran for patient as noted in ED discharge notes.     Pharmacy - Rola JEFFRIESN, RN

## 2022-02-22 NOTE — TELEPHONE ENCOUNTER
Pt called stating she is pregnant and she has been throwing up since this morning. Pt also reported she is having liquid diarrhea and there is burnings behind her belly button. Pt rated her pain level 6/10. Pt stated she has never been sick like this. Pt stated she is week, dizzy and cold pale clammy and increased heart rate.       Per protocal pt advised to go to call 911 and she stated okay.      Dagoberto Cruz RN  [facility]       COVID 19 Nurse Triage Plan/Patient Instructions    Please be aware that novel coronavirus (COVID-19) may be circulating in the community. If you develop symptoms such as fever, cough, or SOB or if you have concerns about the presence of another infection including coronavirus (COVID-19), please contact your health care provider or visit [email or web address].     Disposition/Instructions    Call to EMS/911 recommended. Follow protocol based instructions.     Bring Your Own Device:  Please also bring your smart device(s) (smart phones, tablets, laptops) and their charging cables for your personal use and to communicate with your care team during your visit.    Thank you for taking steps to prevent the spread of this virus.  o Limit your contact with others.  o Wear a simple mask to cover your cough.  o Wash your hands well and often.    Resources    [facility]: About COVID-19: [email or web address]    CDC: What to Do If You're Sick: www.cdc.gov/coronavirus/2019-ncov/about/steps-when-sick.html    CDC: Ending Home Isolation: www.cdc.gov/coronavirus/2019-ncov/hcp/disposition-in-home-patients.html     CDC: Caring for Someone: www.cdc.gov/coronavirus/2019-ncov/if-you-are-sick/care-for-someone.html     [facility]: Interim Guidance for Hospital Discharge to Home: www.health.[facility].mn.us/diseases/coronavirus/hcp/hospdischarge.pdf    [facility] clinical trials (COVID-19 research studies): clinicalaffairs.[facility]/n-clinical-trials     Below are  the COVID-YoBucko hotlines at the [place] Department of Health ([facility]). Interpreters are available.   o For health questions: Call 320-418-6190 or 1-772.928.8618 (7 a.m. to 7 p.m.)  o For questions about schools and childcare: Call 527-164-3589 or 1-531.939.1655 (7 a.m. to 7 p.m.)                               Reason for Disposition    Shock suspected (e.g., cold/pale/clammy skin, too weak to stand, low BP, rapid pulse)    Additional Information    Negative: Passed out (i.e., lost consciousness, collapsed and was not responding)    Protocols used: PREGNANCY - ABDOMINAL PAIN GREATER THAN 20 WEEKS EGA-A-

## 2022-02-22 NOTE — TELEPHONE ENCOUNTER
Pt calling in again asking if a prescription was sent to her pharmacy for Zofran.  I see the below triage note was routed to Dr. Norris, pt's primary OB provider, but Dr. Norris is post call today and not in.    Routing to  OB/GYN covering provider pool to see if the are willing to send in an rx for Zofran for pt since the [facility] ER she was at forgot to send her one.  Pt is hoping to get this within the next hour.    Naty Daigle RN

## 2022-02-24 ENCOUNTER — TELEPHONE (OUTPATIENT)
Dept: OBGYN | Facility: OTHER | Age: 32
End: 2022-02-24

## 2022-02-24 ENCOUNTER — PRENATAL OFFICE VISIT (OUTPATIENT)
Dept: OBGYN | Facility: OTHER | Age: 32
End: 2022-02-24
Payer: COMMERCIAL

## 2022-02-24 VITALS
BODY MASS INDEX: 33.84 KG/M2 | SYSTOLIC BLOOD PRESSURE: 104 MMHG | OXYGEN SATURATION: 100 % | DIASTOLIC BLOOD PRESSURE: 64 MMHG | HEART RATE: 68 BPM | WEIGHT: 185 LBS

## 2022-02-24 DIAGNOSIS — O99.013 ANEMIA AFFECTING PREGNANCY IN THIRD TRIMESTER: ICD-10-CM

## 2022-02-24 DIAGNOSIS — Z23 NEED FOR TDAP VACCINATION: ICD-10-CM

## 2022-02-24 DIAGNOSIS — O34.219 HISTORY OF CESAREAN DELIVERY AFFECTING PREGNANCY: Primary | ICD-10-CM

## 2022-02-24 DIAGNOSIS — Z30.2 ENCOUNTER FOR STERILIZATION: ICD-10-CM

## 2022-02-24 LAB
FERRITIN SERPL-MCNC: 9 NG/ML (ref 12–150)
HGB BLD-MCNC: 10.4 G/DL (ref 11.7–15.7)

## 2022-02-24 PROCEDURE — 82728 ASSAY OF FERRITIN: CPT | Performed by: OBSTETRICS & GYNECOLOGY

## 2022-02-24 PROCEDURE — 90715 TDAP VACCINE 7 YRS/> IM: CPT | Performed by: OBSTETRICS & GYNECOLOGY

## 2022-02-24 PROCEDURE — 36415 COLL VENOUS BLD VENIPUNCTURE: CPT | Performed by: OBSTETRICS & GYNECOLOGY

## 2022-02-24 PROCEDURE — 90471 IMMUNIZATION ADMIN: CPT | Performed by: OBSTETRICS & GYNECOLOGY

## 2022-02-24 PROCEDURE — 85018 HEMOGLOBIN: CPT | Performed by: OBSTETRICS & GYNECOLOGY

## 2022-02-24 PROCEDURE — 99207 PR PRENATAL VISIT: CPT | Performed by: OBSTETRICS & GYNECOLOGY

## 2022-02-24 NOTE — PROGRESS NOTES
Presents for routine  appointment.     No complaints.  She was see at [facility] on 22 for N/V/D.  Had a viral gastroenteritis.  Better now.   No abnormal discharge , no leaking fluid , no contractions , no vaginal bleeding    ROS:   and GI  negative.     Please see Prenatal Vitals and Notes Flowsheet for objective data.  Hemoglobin   Date Value Ref Range Status   2022 10.4 (L) 11.7 - 15.7 g/dL Final   2020 10.6 (L) 11.7 - 15.7 g/dL Final       A/P:  31 year old  at 30w4d       ICD-10-CM    1. History of  delivery affecting pregnancy  O34.219 Ramila-Operative Worksheet   2. Need for Tdap vaccination  Z23 TDAP VACCINE (Adacel, Boostrix)  [[id number]]   3. Encounter for sterilization  Z30.2    4. Anemia affecting pregnancy in third trimester  O99.013 Hemoglobin     Ferritin     Hemoglobin     Ferritin       GCT Abnormal: 131, normal 3 hr.  TDAP today.    Rhogam: not needed  Discussed kick counts   Estimated Date of Delivery: May 1, 2022.  She will be 39 weeks .  I am on call on the  and will get it scheduled   Anemia in pregnancy - plan repeat labs today.  Iron supplements upset her stomach.  Follow up in 2 weeks.      Anna Norris MD

## 2022-02-24 NOTE — TELEPHONE ENCOUNTER
Jacqueline Bolden  Legal Name:   Jacqueline Bolden  Female, 31 year old, 1990  MRN:   [id number]  SB#:   #[id number]  Phone:   [phone] (M)      Authorizing Provider Encounter Provider   Anna Norris MD Monson, Michelle Lynn, MD     Associated Diagnoses    History of  delivery affecting pregnancy  - Primary         Order Questions    Question Answer Comment   Procedure name(s) - multi select  Delivery and bilateral tubal ligation    Reason for procedure history of , desires sterilization    Is this a multi surgeon case? No    Laterality N/A    Request for additional equipment Other (see comments) None   Anesthesia Spinal    Positioning (if different from preference card): Supine    Is the patient known to have any of the following? None of the above    Initiate Pre-op orders for above procedure: Yes, as ordered in Epic additional orders noted there also   Location of Case: [facility]    Sterilization or Hysterectomy consent signed in advance: Yes (note date signed in comments) 2/3/22   Urgency of Surgery: Routine    Surgeon Procedure Time (incision to closure) in minutes (per procedure as applicable) 60    Note: Surgical Case Time Needed (in minutes)   Surgery Date: 39-40 weeks the 25th when I am on call is preferrred   Patient Class (for admit prior to surgery, specify number of days in comments): Surgery admit admit day of surgery   H&P To Be Completed By: Surgeon     needed? No    Post-Op Appointment 6 weeks    Vendor Needed? No    SURGERY SCHEDULING AND PRECERTIFICATION    Medical Record Number: [id number]  Jacqueline Bolden  YOB: 1990   Phone: [phone] (home)   Primary Provider: No Ref-Primary, Physician    Reason for Admit: Previous C/S  Sterilization   ICD-10 CODE:  034.219 Z30.0    Surgeon: Anna Norris MD  Surgical Procedure: Repeat C/S  BTL    Date of Surgery 2022 Time of Surgery 7:30 am   Surgery to be performed  at:  [facility]  Status: Inpatient- Length of stay:  3 days.  Type of Anesthesia Anticipated: Spinal     Sterilization consent:  Yes and was signed on 02/03/2022.    Pre-Op: at your regular prenatal appointment  COVID testing:  The Covid test has been scheduled for 04/25/2022 at [facility] at 10:00 am .  Post-Op: patient to call and schedule      Pre-certification routed to Financial Counselors:  Yes    Surgery packet mailed to patient's home address: Yes  Patient instructed NPO 12 hours prior to surgery, arrive 1 hour 45 minutes prior to surgery, must have a .  Patient understood and agrees to the plan.      Requestor:  Julianna Castaneda     Location:  [place] [phone]

## 2022-02-24 NOTE — RESULT ENCOUNTER NOTE
Hi,Your hemoglobin is somewhat low, but stable.  Your ferritin level is also somewhat low.  We can consider iron infusions.  Otherwise, plan to increase the iron in your diet and we can repeat the labs in about a month.  Please let me know if you have any questions or concerns. Thanks!Dr. Norris

## 2022-03-01 NOTE — TELEPHONE ENCOUNTER
PB DOS: 2022 *PA Form emailed to OB team to have provider sign it  Type of Procedure:  Delivery and bilateral tubal ligation  3 day hospital stay  CPT Codes:33652  ICD10 Codes: History of  delivery affecting pregnancy O34.219  Encounter for sterilization Z30.2  Aniema affecting pregnancy in third Trimester O99.013  Surgeon/Ordering provider: Anna Norris  [id number]  Pre-cert/Authorization completed:  PA Required   Payer: [facility] CHERELLE  Spoke to [facility] Online Authorizations Portal   Ref. # / Auth #   Valid Dates:

## 2022-03-07 NOTE — TELEPHONE ENCOUNTER
PB DOS: 2022   Type of Procedure:  Delivery and bilateral tubal ligation  3 day hospital stay  CPT Codes:85004  ICD10 Codes: History of  delivery affecting pregnancy O34.219  Encounter for sterilization Z30.2  Aniema affecting pregnancy in third Trimester O99.013  Surgeon/Ordering provider: Anna Norris  [id number]  Pre-cert/Authorization completed:  No PA required  Payer: [facility] CHERELLE  Spoke to [facility] Online Authorizations Portal   Ref. # / Auth #   Valid Dates:     Surgery form and last OV note faxed to [facility].

## 2022-03-11 ENCOUNTER — PRENATAL OFFICE VISIT (OUTPATIENT)
Dept: OBGYN | Facility: OTHER | Age: 32
End: 2022-03-11
Payer: COMMERCIAL

## 2022-03-11 VITALS — WEIGHT: 191 LBS | DIASTOLIC BLOOD PRESSURE: 74 MMHG | SYSTOLIC BLOOD PRESSURE: 111 MMHG | BODY MASS INDEX: 34.93 KG/M2

## 2022-03-11 DIAGNOSIS — Z34.93 NORMAL PREGNANCY IN THIRD TRIMESTER: Primary | ICD-10-CM

## 2022-03-11 DIAGNOSIS — Z98.891 HISTORY OF CESAREAN DELIVERY: ICD-10-CM

## 2022-03-11 DIAGNOSIS — Z87.59 HISTORY OF POSTPARTUM HEMORRHAGE: ICD-10-CM

## 2022-03-11 PROCEDURE — 99207 PR PRENATAL VISIT: CPT | Performed by: OBSTETRICS & GYNECOLOGY

## 2022-03-11 NOTE — PROGRESS NOTES
31 year old  at 32w5d weeks presents to the clinic for a routine prenatal visit.    No concerns.  Patient denies any vaginal bleeding, leakage of fluid, or contractions     Good fetal movement  Fundal height=33cm  XWZt=887  RCS with TL=  Discussed labor precautions.  RTC 2 weeks    Ana Lilia Danielle DO

## 2022-03-12 PROBLEM — Z34.93 NORMAL PREGNANCY IN THIRD TRIMESTER: Status: ACTIVE | Noted: 2021-10-26

## 2022-03-24 ENCOUNTER — PRENATAL OFFICE VISIT (OUTPATIENT)
Dept: OBGYN | Facility: OTHER | Age: 32
End: 2022-03-24
Payer: COMMERCIAL

## 2022-03-24 VITALS
OXYGEN SATURATION: 100 % | BODY MASS INDEX: 36.21 KG/M2 | SYSTOLIC BLOOD PRESSURE: 122 MMHG | HEART RATE: 78 BPM | WEIGHT: 198 LBS | DIASTOLIC BLOOD PRESSURE: 68 MMHG

## 2022-03-24 DIAGNOSIS — O34.219 HISTORY OF CESAREAN DELIVERY AFFECTING PREGNANCY: Primary | ICD-10-CM

## 2022-03-24 DIAGNOSIS — Z30.2 ENCOUNTER FOR STERILIZATION: ICD-10-CM

## 2022-03-24 DIAGNOSIS — O99.013 ANEMIA AFFECTING PREGNANCY IN THIRD TRIMESTER: ICD-10-CM

## 2022-03-24 LAB
ERYTHROCYTE [DISTWIDTH] IN BLOOD BY AUTOMATED COUNT: 15.7 % (ref 10–15)
HCT VFR BLD AUTO: 32.5 % (ref 35–47)
HGB BLD-MCNC: 9.9 G/DL (ref 11.7–15.7)
MCH RBC QN AUTO: 23.1 PG (ref 26.5–33)
MCHC RBC AUTO-ENTMCNC: 30.5 G/DL (ref 31.5–36.5)
MCV RBC AUTO: 76 FL (ref 78–100)
PLATELET # BLD AUTO: 280 10E3/UL (ref 150–450)
RBC # BLD AUTO: 4.28 10E6/UL (ref 3.8–5.2)
WBC # BLD AUTO: 12.9 10E3/UL (ref 4–11)

## 2022-03-24 PROCEDURE — 82728 ASSAY OF FERRITIN: CPT | Performed by: OBSTETRICS & GYNECOLOGY

## 2022-03-24 PROCEDURE — 36415 COLL VENOUS BLD VENIPUNCTURE: CPT | Performed by: OBSTETRICS & GYNECOLOGY

## 2022-03-24 PROCEDURE — 85027 COMPLETE CBC AUTOMATED: CPT | Performed by: OBSTETRICS & GYNECOLOGY

## 2022-03-24 PROCEDURE — 99207 PR PRENATAL VISIT: CPT | Performed by: OBSTETRICS & GYNECOLOGY

## 2022-03-24 NOTE — PROGRESS NOTES
Presents for routine  appointment.     No complaints.    No abnormal discharge , no leaking fluid , no contractions , no vaginal bleeding    ROS:   and GI  negative.     Please see Prenatal Vitals and Notes Flowsheet for objective data.    A/P:  32 year old  at 34w4d       ICD-10-CM    1. History of  delivery affecting pregnancy  O34.219    2. Encounter for sterilization  Z30.2    3. Anemia affecting pregnancy in third trimester  O99.013 CBC with platelets     Ferritin       Reportable signs and symptoms discussed  Group B Strep next visit   RCS with TL=  Anemia in pregnancy - labs today.  If no improvement, plan IV iron infusions.  Follow up in 2 weeks.      Anna Norris MD

## 2022-03-25 DIAGNOSIS — O99.013 ANEMIA DURING PREGNANCY IN THIRD TRIMESTER: Primary | ICD-10-CM

## 2022-03-25 LAB — FERRITIN SERPL-MCNC: 5 NG/ML (ref 12–150)

## 2022-03-25 RX ORDER — MEPERIDINE HYDROCHLORIDE 25 MG/ML
25 INJECTION INTRAMUSCULAR; INTRAVENOUS; SUBCUTANEOUS EVERY 30 MIN PRN
Status: CANCELLED | OUTPATIENT
Start: 2022-03-25

## 2022-03-25 RX ORDER — ALBUTEROL SULFATE 0.83 MG/ML
2.5 SOLUTION RESPIRATORY (INHALATION)
Status: CANCELLED | OUTPATIENT
Start: 2022-03-25

## 2022-03-25 RX ORDER — ALBUTEROL SULFATE 90 UG/1
1-2 AEROSOL, METERED RESPIRATORY (INHALATION)
Status: CANCELLED
Start: 2022-03-25

## 2022-03-25 RX ORDER — EPINEPHRINE 1 MG/ML
0.3 INJECTION, SOLUTION INTRAMUSCULAR; SUBCUTANEOUS EVERY 5 MIN PRN
Status: CANCELLED | OUTPATIENT
Start: 2022-03-25

## 2022-03-25 RX ORDER — NALOXONE HYDROCHLORIDE 0.4 MG/ML
0.2 INJECTION, SOLUTION INTRAMUSCULAR; INTRAVENOUS; SUBCUTANEOUS
Status: CANCELLED | OUTPATIENT
Start: 2022-03-25

## 2022-03-25 RX ORDER — METHYLPREDNISOLONE SODIUM SUCCINATE 125 MG/2ML
125 INJECTION, POWDER, LYOPHILIZED, FOR SOLUTION INTRAMUSCULAR; INTRAVENOUS
Status: CANCELLED
Start: 2022-03-25

## 2022-03-25 RX ORDER — DIPHENHYDRAMINE HYDROCHLORIDE 50 MG/ML
50 INJECTION INTRAMUSCULAR; INTRAVENOUS
Status: CANCELLED
Start: 2022-03-25

## 2022-04-01 ENCOUNTER — LAB (OUTPATIENT)
Dept: LAB | Facility: CLINIC | Age: 32
End: 2022-04-01
Attending: OBSTETRICS & GYNECOLOGY
Payer: COMMERCIAL

## 2022-04-01 DIAGNOSIS — O99.013 ANEMIA DURING PREGNANCY IN THIRD TRIMESTER: ICD-10-CM

## 2022-04-01 LAB — SARS-COV-2 RNA RESP QL NAA+PROBE: NEGATIVE

## 2022-04-01 PROCEDURE — U0003 INFECTIOUS AGENT DETECTION BY NUCLEIC ACID (DNA OR RNA); SEVERE ACUTE RESPIRATORY SYNDROME CORONAVIRUS 2 (SARS-COV-2) (CORONAVIRUS DISEASE [COVID-19]), AMPLIFIED PROBE TECHNIQUE, MAKING USE OF HIGH THROUGHPUT TECHNOLOGIES AS DESCRIBED BY CMS-2020-01-R: HCPCS

## 2022-04-01 PROCEDURE — U0005 INFEC AGEN DETEC AMPLI PROBE: HCPCS

## 2022-04-04 ENCOUNTER — INFUSION THERAPY VISIT (OUTPATIENT)
Dept: INFUSION THERAPY | Facility: CLINIC | Age: 32
End: 2022-04-04
Attending: OBSTETRICS & GYNECOLOGY
Payer: COMMERCIAL

## 2022-04-04 DIAGNOSIS — O99.013 ANEMIA DURING PREGNANCY IN THIRD TRIMESTER: Primary | ICD-10-CM

## 2022-04-04 PROCEDURE — 99207 PR NO CHARGE LOS: CPT

## 2022-04-04 PROCEDURE — 96365 THER/PROPH/DIAG IV INF INIT: CPT | Performed by: NURSE PRACTITIONER

## 2022-04-04 PROCEDURE — 96366 THER/PROPH/DIAG IV INF ADDON: CPT | Performed by: NURSE PRACTITIONER

## 2022-04-04 RX ORDER — ALBUTEROL SULFATE 0.83 MG/ML
2.5 SOLUTION RESPIRATORY (INHALATION)
Status: CANCELLED | OUTPATIENT
Start: 2022-04-06

## 2022-04-04 RX ORDER — DIPHENHYDRAMINE HYDROCHLORIDE 50 MG/ML
50 INJECTION INTRAMUSCULAR; INTRAVENOUS
Status: CANCELLED
Start: 2022-04-06

## 2022-04-04 RX ORDER — MEPERIDINE HYDROCHLORIDE 25 MG/ML
25 INJECTION INTRAMUSCULAR; INTRAVENOUS; SUBCUTANEOUS EVERY 30 MIN PRN
Status: CANCELLED | OUTPATIENT
Start: 2022-04-06

## 2022-04-04 RX ORDER — EPINEPHRINE 1 MG/ML
0.3 INJECTION, SOLUTION INTRAMUSCULAR; SUBCUTANEOUS EVERY 5 MIN PRN
Status: CANCELLED | OUTPATIENT
Start: 2022-04-06

## 2022-04-04 RX ORDER — ALBUTEROL SULFATE 90 UG/1
1-2 AEROSOL, METERED RESPIRATORY (INHALATION)
Status: CANCELLED
Start: 2022-04-06

## 2022-04-04 RX ORDER — NALOXONE HYDROCHLORIDE 0.4 MG/ML
0.2 INJECTION, SOLUTION INTRAMUSCULAR; INTRAVENOUS; SUBCUTANEOUS
Status: CANCELLED | OUTPATIENT
Start: 2022-04-06

## 2022-04-04 RX ORDER — METHYLPREDNISOLONE SODIUM SUCCINATE 125 MG/2ML
125 INJECTION, POWDER, LYOPHILIZED, FOR SOLUTION INTRAMUSCULAR; INTRAVENOUS
Status: CANCELLED
Start: 2022-04-06

## 2022-04-04 NOTE — PROGRESS NOTES
Infusion Nursing Note:  Jacqueline Bolden presents today for venofer #1/3.    Patient seen by provider today: No   present during visit today: Not Applicable.      Intravenous Access:  Peripheral IV placed.    Treatment Conditions:  No labs needed.      Post Infusion Assessment:  Patient tolerated infusion without incident.  Blood return noted pre and post infusion.  Site patent and intact, free from redness, edema or discomfort.  No evidence of extravasations.  Access discontinued per protocol.       Discharge Plan:   Patient discharged in stable condition accompanied by: self.  Departure Mode: Ambulatory.  Verbally reviewed next venofer appointment on 4/11/22.      Anna Baker RN

## 2022-04-07 ENCOUNTER — PRENATAL OFFICE VISIT (OUTPATIENT)
Dept: OBGYN | Facility: OTHER | Age: 32
End: 2022-04-07
Payer: COMMERCIAL

## 2022-04-07 VITALS
SYSTOLIC BLOOD PRESSURE: 119 MMHG | HEART RATE: 78 BPM | WEIGHT: 203 LBS | OXYGEN SATURATION: 99 % | DIASTOLIC BLOOD PRESSURE: 79 MMHG | BODY MASS INDEX: 37.13 KG/M2

## 2022-04-07 DIAGNOSIS — Z30.2 ENCOUNTER FOR STERILIZATION: ICD-10-CM

## 2022-04-07 DIAGNOSIS — O99.013 ANEMIA AFFECTING PREGNANCY IN THIRD TRIMESTER: ICD-10-CM

## 2022-04-07 DIAGNOSIS — O34.219 HISTORY OF CESAREAN DELIVERY AFFECTING PREGNANCY: Primary | ICD-10-CM

## 2022-04-07 PROCEDURE — 87653 STREP B DNA AMP PROBE: CPT | Performed by: OBSTETRICS & GYNECOLOGY

## 2022-04-07 PROCEDURE — 99207 PR PRENATAL VISIT: CPT | Performed by: OBSTETRICS & GYNECOLOGY

## 2022-04-07 NOTE — PROGRESS NOTES
2022     NAME:  Jacqueline Bolden  PCP:  No Ref-Primary, Physician  MRN:  [id number]    PREOPERATIVE EXAM    Impression / Plan     Assessment:     32 year old  at 36w4d with history of  x2    Family status complete, desires sterilization    Anemia in pregnancy, currently undergoing iron infusions.    Body mass index is 37.13 kg/m .     Patient is at  intermediate risk for the proposed surgery      Plan:      Repeat  section with bilateral partial salpingectomy   We discussed the plans for  section. She is aware of the risks, benefits, and alternatives to  delivery.  Risks include but are not limited to bleeding, infections which may require antibiotic therapy, injury maternal organs (to include but not limited to blood vessels, nerves, muscle, skin, bladder, ureters, bowel, uterus, tubes, ovaries), as well as risk of injury to the fetus.  If bleeding is excessive it might require transfusion or rarely a hysterectomy.    We did discuss the tubal ligation as an option at the same time and she does want that to be done.      Medications are to be stopped before surgery.      She is cleared for surgery    We will check to see if Monday, May 23 opens up.  Currently the  spots are full at [facility]    HPI     Jacqueline Bolden is a  32 year old who is seen for routine OB appointment.    Patient has no concerns today.  She denies contractions, leaking of amniotic fluid, and vaginal bleeding.      She has 2 more iron infusions scheduled.  Her next one being a week before her .      1. No - Have you ever had a heart attack or stroke?  2. No - Have you ever had surgery on your heart or blood vessels, such as a stent, coronary (heart) bypass, or surgery on an artery in the head, neck, heart, or legs?  3. No - Do you have chest pain when you are physically active?  4. No - Do you have a history of heart failure?  5. No - Do you currently have a  cold, bronchitis, or symptoms of other respiratory (head and chest) infections?  6. No - Do you have a cough, shortness of breath, or wheezing?  7. No - Do you or anyone in your family have a history of blood clots?  8. No - Do you or anyone in your family have a serious bleeding problem, such as long-lasting bleeding after surgeries or cuts?  9. yes- Have you ever had anemia or been told to take iron pills?  10. No - Have you had any abnormal blood loss such as black, tarry or bloody stools, or abnormal vaginal bleeding?  11. No - Have you ever had a blood transfusion?  12. Yes - Are you willing to have a blood transfusion if it is medically needed before, during, or after your surgery?  13. No - Have you or anyone in your family ever had problems with anesthesia (sedation for surgery)?  14. No - Do you have sleep apnea, excessive snoring, or daytime drowsiness?   15. No - Do you have any artifical heart valves or other implanted medical devices, such as a pacemaker, defibrillator, or continuous glucose monitor?  16. No - Do you have any artifical joints?  17. No - Are you allergic to latex?          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          Allergies     Allergies   Allergen Reactions     No Known Drug Allergies        Medical / Surgical History     Past Medical History:   Diagnosis Date     Dysmenorrhea       History of infertility        Past Surgical History:   Procedure Laterality Date      SECTION  10/25/2018      SECTION  /    Repeat LTCS, prior  section desires repeat, pregnancy with history of infertility       Social History     Social History     Socioeconomic History     Marital status:      Spouse name: Not on file     Number of children: Not on file     Years of education: Not on file     Highest education level: Not on file   Occupational History     Occupation: student     Employer: 0STUDENT     Comment: [employer]     Occupation: [email or web address]     Employer: STUDENT   Tobacco Use     Smoking status: Former Smoker     Packs/day: 1.00     Types: Cigarettes     Quit date: 3/5/2013     Years since quittin.0     Smokeless tobacco: Never Used     Tobacco comment:  1 pack every 5 days   Vaping Use     Vaping Use: Never used   Substance and Sexual Activity     Alcohol use: Not Currently     Comment: OCC.     Drug use: No     Sexual activity: Yes     Partners: Male     Birth control/protection: None   Other Topics Concern     Parent/sibling w/ CABG, MI or angioplasty before 65F 55M? Not Asked   Social History Narrative    Lives with housemates. No domestic violence issues.     Social Determinants of Health     Financial Resource Strain: Not on file   Food Insecurity: Not on file   Transportation Needs: Not on file   Physical Activity: Not on file   Stress: Not on file   Social Connections: Not on file   Intimate Partner Violence: Not on file   Housing Stability: Not on file       Family History     Family History   Problem Relation Age of Onset     Family History Negative No family hx of        ROS     Pertinent positives and negatives are listed in the HPI. All other organ systems can be considered negative.     Physical Exam   Vitals: /79 (BP Location: Right arm, Cuff Size: Adult Regular)   Pulse 78   Wt 92.1 kg (203 lb)   LMP  07/25/2021 (Exact Date)   SpO2 99%   BMI 37.13 kg/m      General: Well developed, well nourished, pleasant. No acute distress.    Neck: Trachea midline. Supple, no palpable masses.  No thyromegaly.  CV: Regular rhythm, normal rate. No murmurs auscultated  Resp: Nonlabored. Clear to auscultation bilaterally, no wheezes.   Abdomen: Soft, non tender, gravid  Skin: No rashes, lesions, or subcutaneous nodules.   : cervix is closed  Neuro:  Alert and oriented times 3.  Appropriate.    See obstetrical flowsheet for additional findings.     Labs/Imaging       Labs were reviewed in [facility]  Imaging was reviewed in Epic.        Anna Norris MD

## 2022-04-08 LAB — GP B STREP DNA SPEC QL NAA+PROBE: NEGATIVE

## 2022-04-11 ENCOUNTER — INFUSION THERAPY VISIT (OUTPATIENT)
Dept: INFUSION THERAPY | Facility: CLINIC | Age: 32
End: 2022-04-11
Attending: OBSTETRICS & GYNECOLOGY
Payer: COMMERCIAL

## 2022-04-11 VITALS
TEMPERATURE: 97.1 F | BODY MASS INDEX: 36.4 KG/M2 | SYSTOLIC BLOOD PRESSURE: 106 MMHG | DIASTOLIC BLOOD PRESSURE: 63 MMHG | RESPIRATION RATE: 18 BRPM | HEART RATE: 93 BPM | WEIGHT: 199 LBS | OXYGEN SATURATION: 99 %

## 2022-04-11 DIAGNOSIS — O99.013 ANEMIA DURING PREGNANCY IN THIRD TRIMESTER: Primary | ICD-10-CM

## 2022-04-11 PROCEDURE — 96365 THER/PROPH/DIAG IV INF INIT: CPT | Performed by: NURSE PRACTITIONER

## 2022-04-11 PROCEDURE — 99207 PR NO CHARGE LOS: CPT

## 2022-04-11 PROCEDURE — 96366 THER/PROPH/DIAG IV INF ADDON: CPT | Performed by: NURSE PRACTITIONER

## 2022-04-11 RX ORDER — NALOXONE HYDROCHLORIDE 0.4 MG/ML
0.2 INJECTION, SOLUTION INTRAMUSCULAR; INTRAVENOUS; SUBCUTANEOUS
Status: CANCELLED | OUTPATIENT
Start: 2022-04-12

## 2022-04-11 RX ORDER — ALBUTEROL SULFATE 90 UG/1
1-2 AEROSOL, METERED RESPIRATORY (INHALATION)
Status: CANCELLED
Start: 2022-04-12

## 2022-04-11 RX ORDER — METHYLPREDNISOLONE SODIUM SUCCINATE 125 MG/2ML
125 INJECTION, POWDER, LYOPHILIZED, FOR SOLUTION INTRAMUSCULAR; INTRAVENOUS
Status: CANCELLED
Start: 2022-04-12

## 2022-04-11 RX ORDER — DIPHENHYDRAMINE HYDROCHLORIDE 50 MG/ML
50 INJECTION INTRAMUSCULAR; INTRAVENOUS
Status: CANCELLED
Start: 2022-04-12

## 2022-04-11 RX ORDER — EPINEPHRINE 1 MG/ML
0.3 INJECTION, SOLUTION INTRAMUSCULAR; SUBCUTANEOUS EVERY 5 MIN PRN
Status: CANCELLED | OUTPATIENT
Start: 2022-04-12

## 2022-04-11 RX ORDER — MEPERIDINE HYDROCHLORIDE 25 MG/ML
25 INJECTION INTRAMUSCULAR; INTRAVENOUS; SUBCUTANEOUS EVERY 30 MIN PRN
Status: CANCELLED | OUTPATIENT
Start: 2022-04-12

## 2022-04-11 RX ORDER — ALBUTEROL SULFATE 0.83 MG/ML
2.5 SOLUTION RESPIRATORY (INHALATION)
Status: CANCELLED | OUTPATIENT
Start: 2022-04-12

## 2022-04-11 NOTE — PROGRESS NOTES
Infusion Nursing Note:  Jacqueline Bolden presents today for Venofer 2/3.    Patient seen by provider today: No   present during visit today: Not Applicable.    Note: Pt denied any new medical concerns.      Intravenous Access:  Peripheral IV placed.    Treatment Conditions:  Not Applicable.      Post Infusion Assessment:  Patient tolerated infusion without incident.  Blood return noted pre and post infusion.  Site patent and intact, free from redness, edema or discomfort.  No evidence of extravasations.  Access discontinued per protocol.       Discharge Plan:   AVS to patient via MYCHART.  Patient will return 4/18/22 for next appointment.   Patient discharged in stable condition accompanied by: self.  Departure Mode: Ambulatory.      Marsha Knox RN

## 2022-04-14 ENCOUNTER — PRENATAL OFFICE VISIT (OUTPATIENT)
Dept: OBGYN | Facility: OTHER | Age: 32
End: 2022-04-14
Payer: COMMERCIAL

## 2022-04-14 VITALS
SYSTOLIC BLOOD PRESSURE: 108 MMHG | HEART RATE: 78 BPM | OXYGEN SATURATION: 100 % | BODY MASS INDEX: 37.27 KG/M2 | DIASTOLIC BLOOD PRESSURE: 65 MMHG | WEIGHT: 203.75 LBS

## 2022-04-14 DIAGNOSIS — O34.219 HISTORY OF CESAREAN DELIVERY AFFECTING PREGNANCY: Primary | ICD-10-CM

## 2022-04-14 DIAGNOSIS — O99.013 ANEMIA AFFECTING PREGNANCY IN THIRD TRIMESTER: ICD-10-CM

## 2022-04-14 PROCEDURE — 99207 PR PRENATAL VISIT: CPT | Performed by: OBSTETRICS & GYNECOLOGY

## 2022-04-14 NOTE — PATIENT INSTRUCTIONS
If you have labs or imaging done, the results will automatically release in [facility] without an interpretation.  Your health care professional will review those results and send an interpretation with recommendations as soon as possible, but this may be 1-3 business days.    If you have any questions regarding your visit, please contact your care team.     [employer] Access Services: [phone]  [facility] CLINIC HOURS TELEPHONE NUMBER       MD Jossy Hatch - MIGUELANGEL Matos-ELIA Alfonso-ELIA Howell-ELIA Levine-  Heather-     Monday- [place]  8:00 a.m - 5:00 p.m    Tuesday- Surgery    Wednesday- Admin    Thursday- [place]  8:00 a.m - 5:00 p.m.    Friday- [place]  7:30 a.m - 4:00 p.m. [facility]  [address]  [place] [address]  [phone] Fax  Imaging [phone]    [facility] Labor and Delivery  [address] [place] [address] [address].  [place], [place] [address] [phone]    [facility]  [address] [place] [address] [phone] Fax  Imaging [phone]     Urgent Care locations:  [facility] Monday-Friday  10 am - 8 pm  Saturday and Sunday   9 am - 5 pm  Monday-Friday   10 am - 8 pm  Saturday and Sunday   9 am - 5 pm   [phone]     If you need a medication refill, please contact your pharmacy. Please allow 3 business days for your refill to be completed.    As always, thank you for trusting us with your healthcare needs!

## 2022-04-14 NOTE — PROGRESS NOTES
Presents for routine  appointment.     No complaints.    No abnormal discharge , no leaking fluid , no regular contractions , no vaginal bleeding    ROS:   and GI  negative.     Please see Prenatal Vitals and Notes Flowsheet for objective data.      A/P:  32 year old  at 37w4d       ICD-10-CM    1. History of  delivery affecting pregnancy  O34.219    2. Anemia affecting pregnancy in third trimester  O99.013        Labor precautions given   Preop done 22  RCS with TL=  Anemia in pregnancy, currently undergoing iron infusions.  Group B Strep neg  Follow up in 1 week.      Anna Norris MD

## 2022-04-18 ENCOUNTER — INFUSION THERAPY VISIT (OUTPATIENT)
Dept: INFUSION THERAPY | Facility: CLINIC | Age: 32
End: 2022-04-18
Payer: COMMERCIAL

## 2022-04-18 VITALS
SYSTOLIC BLOOD PRESSURE: 108 MMHG | BODY MASS INDEX: 37.49 KG/M2 | DIASTOLIC BLOOD PRESSURE: 72 MMHG | HEART RATE: 92 BPM | RESPIRATION RATE: 18 BRPM | TEMPERATURE: 97.8 F | OXYGEN SATURATION: 99 % | WEIGHT: 205 LBS

## 2022-04-18 DIAGNOSIS — O99.013 ANEMIA DURING PREGNANCY IN THIRD TRIMESTER: Primary | ICD-10-CM

## 2022-04-18 PROCEDURE — 96365 THER/PROPH/DIAG IV INF INIT: CPT | Performed by: NURSE PRACTITIONER

## 2022-04-18 PROCEDURE — 99207 PR NO CHARGE LOS: CPT

## 2022-04-18 PROCEDURE — 96366 THER/PROPH/DIAG IV INF ADDON: CPT | Performed by: NURSE PRACTITIONER

## 2022-04-18 RX ORDER — DIPHENHYDRAMINE HYDROCHLORIDE 50 MG/ML
50 INJECTION INTRAMUSCULAR; INTRAVENOUS
Status: CANCELLED
Start: 2022-04-19

## 2022-04-18 RX ORDER — ALBUTEROL SULFATE 90 UG/1
1-2 AEROSOL, METERED RESPIRATORY (INHALATION)
Status: CANCELLED
Start: 2022-04-19

## 2022-04-18 RX ORDER — MEPERIDINE HYDROCHLORIDE 25 MG/ML
25 INJECTION INTRAMUSCULAR; INTRAVENOUS; SUBCUTANEOUS EVERY 30 MIN PRN
Status: CANCELLED | OUTPATIENT
Start: 2022-04-19

## 2022-04-18 RX ORDER — EPINEPHRINE 1 MG/ML
0.3 INJECTION, SOLUTION INTRAMUSCULAR; SUBCUTANEOUS EVERY 5 MIN PRN
Status: CANCELLED | OUTPATIENT
Start: 2022-04-19

## 2022-04-18 RX ORDER — ALBUTEROL SULFATE 0.83 MG/ML
2.5 SOLUTION RESPIRATORY (INHALATION)
Status: CANCELLED | OUTPATIENT
Start: 2022-04-19

## 2022-04-18 RX ORDER — NALOXONE HYDROCHLORIDE 0.4 MG/ML
0.2 INJECTION, SOLUTION INTRAMUSCULAR; INTRAVENOUS; SUBCUTANEOUS
Status: CANCELLED | OUTPATIENT
Start: 2022-04-19

## 2022-04-18 RX ORDER — METHYLPREDNISOLONE SODIUM SUCCINATE 125 MG/2ML
125 INJECTION, POWDER, LYOPHILIZED, FOR SOLUTION INTRAMUSCULAR; INTRAVENOUS
Status: CANCELLED
Start: 2022-04-19

## 2022-04-18 ASSESSMENT — PAIN SCALES - GENERAL: PAINLEVEL: MILD PAIN (2)

## 2022-04-18 NOTE — PROGRESS NOTES
Infusion Nursing Note:  Jacqueline Bolden presents today for Venofer.   Patient seen by provider today: No   present during visit today: Not Applicable.    Note: Patient reports tolerating Venofer well and no concerns at this time.      Intravenous Access:  Peripheral IV placed.    Treatment Conditions:  Not Applicable.      Post Infusion Assessment:  Patient tolerated infusion without incident.  Site patent and intact, free from redness, edema or discomfort.  No evidence of extravasations.  Access discontinued per protocol.       Discharge Plan:   AVS to patient via MYCHART.  Patient will return as needed for next appointment.   Patient discharged in stable condition accompanied by: self.  Departure Mode: Ambulatory.      Lidia Pitts RN

## 2022-04-21 NOTE — PATIENT INSTRUCTIONS
If you have labs or imaging done, the results will automatically release in [employer] without an interpretation.  Your health care professional will review those results and send an interpretation with recommendations as soon as possible, but this may be 1-3 business days.    If you have any questions regarding your visit, please contact your care team.     [email or web address] Access Services: [phone]  [facility] CLINIC HOURS TELEPHONE NUMBER       MD Jossy Hatch - Certified Medical Assistant     Shawna-ELIA Alfonso-ELIA Howell-ELIA Levine-  Heather-     Monday- [place]  8:00 a.m - 5:00 p.m    Tuesday- Surgery    Wednesday- Admin    Thursday- [place]  8:00 a.m - 5:00 p.m.    Friday- [place]  7:30 a.m - 4:00 p.m. [facility]  [address]  [place] [address]  [phone] Fax  Imaging Scheduling [phone]    [facility]  [address] [place] [address] [place] [address]  [phone]    [facility]  [address] [facility] [place] [address] [phone] Fax  Imaging Scheduling [phone]     Urgent Care locations:  [facility] Monday-Friday  10 am - 8 pm  Saturday and Sunday   9 am - 5 pm  Monday-Friday   10 am - 8 pm  Saturday and Sunday   9 am - 5 pm   [phone]     If you need a medication refill, please contact your pharmacy. Please allow 3 business days for your refill to be completed.    As always, thank you for trusting us with your healthcare needs!

## 2022-04-22 ENCOUNTER — PRENATAL OFFICE VISIT (OUTPATIENT)
Dept: OBGYN | Facility: CLINIC | Age: 32
End: 2022-04-22
Payer: COMMERCIAL

## 2022-04-22 ENCOUNTER — LAB (OUTPATIENT)
Dept: LAB | Facility: CLINIC | Age: 32
End: 2022-04-22
Payer: COMMERCIAL

## 2022-04-22 VITALS
OXYGEN SATURATION: 100 % | DIASTOLIC BLOOD PRESSURE: 74 MMHG | HEART RATE: 82 BPM | BODY MASS INDEX: 37.39 KG/M2 | SYSTOLIC BLOOD PRESSURE: 122 MMHG | WEIGHT: 204.4 LBS

## 2022-04-22 DIAGNOSIS — O34.219 HISTORY OF CESAREAN DELIVERY AFFECTING PREGNANCY: ICD-10-CM

## 2022-04-22 DIAGNOSIS — O99.013 ANEMIA AFFECTING PREGNANCY IN THIRD TRIMESTER: ICD-10-CM

## 2022-04-22 DIAGNOSIS — O34.219 HISTORY OF CESAREAN DELIVERY AFFECTING PREGNANCY: Primary | ICD-10-CM

## 2022-04-22 DIAGNOSIS — Z30.2 ENCOUNTER FOR STERILIZATION: ICD-10-CM

## 2022-04-22 PROCEDURE — U0005 INFEC AGEN DETEC AMPLI PROBE: HCPCS

## 2022-04-22 PROCEDURE — 99207 PR PRENATAL VISIT: CPT | Performed by: OBSTETRICS & GYNECOLOGY

## 2022-04-22 PROCEDURE — U0003 INFECTIOUS AGENT DETECTION BY NUCLEIC ACID (DNA OR RNA); SEVERE ACUTE RESPIRATORY SYNDROME CORONAVIRUS 2 (SARS-COV-2) (CORONAVIRUS DISEASE [COVID-19]), AMPLIFIED PROBE TECHNIQUE, MAKING USE OF HIGH THROUGHPUT TECHNOLOGIES AS DESCRIBED BY CMS-2020-01-R: HCPCS

## 2022-04-22 NOTE — PROGRESS NOTES
Presents for routine  appointment.     No complaints.    No abnormal discharge , no leaking fluid , no contractions , no vaginal bleeding    ROS:   and GI  negative.     Please see Prenatal Vitals and Notes Flowsheet for objective data.      A/P:  32 year old  at 38w5d       ICD-10-CM    1. History of  delivery affecting pregnancy  O34.219        Labor precautions given   Preop done 22  RCS with TL= - try to move to    Anemia in pregnancy, s/p iron infusions.  Last on .   Group B Strep neg  Follow up in 1 week.      Anna Norris MD

## 2022-04-22 NOTE — PROGRESS NOTES
Patient has an appointment for a pre surgical covid swab and no orders. Please place future orders as needed.    Thank You,  Aracelis Escobar MLT(ASCP)

## 2022-04-23 LAB — SARS-COV-2 RNA RESP QL NAA+PROBE: NEGATIVE

## 2022-05-10 ENCOUNTER — TELEPHONE (OUTPATIENT)
Dept: OBGYN | Facility: OTHER | Age: 32
End: 2022-05-10
Payer: COMMERCIAL

## 2022-05-10 NOTE — TELEPHONE ENCOUNTER
Will have clarence sign in 05/12 when she is in clinic.  Jossy Guerin, MIGUELANGEL 5/10/2022 8:01 AM

## 2022-05-10 NOTE — TELEPHONE ENCOUNTER
[facility] Call Center    Phone Message    May a detailed message be left on voicemail: yes     Reason for Call: Julianna from [facility] requesting that Dr. Norris complete the physicians statement portion of the Sterilization Consent Form tat is located in the media tab of the patient's chart, sign and date.  If the form is dated for today's date or after they are requesting the Dr. Norris make a note under the signature line the date of service was 04/25/2022.  Once the form is completed Julianna is requesting that a copy be faxed to her for insurance purposes.      Fax: [phone]    Action Taken: Message routed to:  [facility] p [phone]    Travel Screening: Not Applicable

## 2022-05-12 NOTE — TELEPHONE ENCOUNTER
Consent signed/ filled out and faxed to number provided below.  Jossy Guerin CMA 5/12/2022 8:37 AM

## 2022-10-09 ENCOUNTER — HEALTH MAINTENANCE LETTER (OUTPATIENT)
Age: 32
End: 2022-10-09

## 2022-11-01 ENCOUNTER — MEDICAL CORRESPONDENCE (OUTPATIENT)
Dept: HEALTH INFORMATION MANAGEMENT | Facility: CLINIC | Age: 32
End: 2022-11-01

## 2023-03-25 ENCOUNTER — HEALTH MAINTENANCE LETTER (OUTPATIENT)
Age: 33
End: 2023-03-25

## 2023-05-26 ENCOUNTER — MYC MEDICAL ADVICE (OUTPATIENT)
Dept: OBGYN | Facility: CLINIC | Age: 33
End: 2023-05-26
Payer: COMMERCIAL

## 2024-05-25 ENCOUNTER — HEALTH MAINTENANCE LETTER (OUTPATIENT)
Age: 34
End: 2024-05-25